# Patient Record
Sex: MALE | Race: BLACK OR AFRICAN AMERICAN | NOT HISPANIC OR LATINO | Employment: FULL TIME | URBAN - METROPOLITAN AREA
[De-identification: names, ages, dates, MRNs, and addresses within clinical notes are randomized per-mention and may not be internally consistent; named-entity substitution may affect disease eponyms.]

---

## 2018-01-03 ENCOUNTER — HOSPITAL ENCOUNTER (EMERGENCY)
Facility: HOSPITAL | Age: 58
Discharge: HOME | End: 2018-01-03
Attending: EMERGENCY MEDICINE | Admitting: EMERGENCY MEDICINE
Payer: COMMERCIAL

## 2018-01-03 ENCOUNTER — APPOINTMENT (EMERGENCY)
Dept: RADIOLOGY | Facility: HOSPITAL | Age: 58
End: 2018-01-03
Payer: COMMERCIAL

## 2018-01-03 VITALS
OXYGEN SATURATION: 90 % | TEMPERATURE: 98.2 F | SYSTOLIC BLOOD PRESSURE: 166 MMHG | HEART RATE: 80 BPM | DIASTOLIC BLOOD PRESSURE: 91 MMHG | RESPIRATION RATE: 18 BRPM | WEIGHT: 181 LBS

## 2018-01-03 DIAGNOSIS — R68.89 FLU-LIKE SYMPTOMS: ICD-10-CM

## 2018-01-03 DIAGNOSIS — R11.0 NAUSEA: Primary | ICD-10-CM

## 2018-01-03 LAB
ALBUMIN SERPL BCP-MCNC: 4.2 G/DL (ref 3.5–5)
ALP SERPL-CCNC: 95 U/L (ref 46–116)
ALT SERPL W P-5'-P-CCNC: 39 U/L (ref 12–78)
ANION GAP SERPL CALCULATED.3IONS-SCNC: 13 MMOL/L (ref 4–13)
AST SERPL W P-5'-P-CCNC: 16 U/L (ref 5–45)
ATRIAL RATE: 70 BPM
BASOPHILS # BLD AUTO: 0.04 THOUSANDS/ΜL (ref 0–0.1)
BASOPHILS NFR BLD AUTO: 0 % (ref 0–1)
BILIRUB SERPL-MCNC: 1.1 MG/DL (ref 0.2–1)
BUN SERPL-MCNC: 10 MG/DL (ref 5–25)
CALCIUM SERPL-MCNC: 9.5 MG/DL (ref 8.3–10.1)
CHLORIDE SERPL-SCNC: 99 MMOL/L (ref 100–108)
CO2 SERPL-SCNC: 23 MMOL/L (ref 21–32)
CREAT SERPL-MCNC: 0.94 MG/DL (ref 0.6–1.3)
EOSINOPHIL # BLD AUTO: 0.09 THOUSAND/ΜL (ref 0–0.61)
EOSINOPHIL NFR BLD AUTO: 1 % (ref 0–6)
ERYTHROCYTE [DISTWIDTH] IN BLOOD BY AUTOMATED COUNT: 11.7 % (ref 11.6–15.1)
GFR SERPL CREATININE-BSD FRML MDRD: 105 ML/MIN/1.73SQ M
GLUCOSE SERPL-MCNC: 264 MG/DL (ref 65–140)
GLUCOSE SERPL-MCNC: 311 MG/DL (ref 65–140)
HCT VFR BLD AUTO: 43 % (ref 36.5–49.3)
HGB BLD-MCNC: 15.5 G/DL (ref 12–17)
LYMPHOCYTES # BLD AUTO: 3.81 THOUSANDS/ΜL (ref 0.6–4.47)
LYMPHOCYTES NFR BLD AUTO: 32 % (ref 14–44)
MCH RBC QN AUTO: 32.6 PG (ref 26.8–34.3)
MCHC RBC AUTO-ENTMCNC: 36 G/DL (ref 31.4–37.4)
MCV RBC AUTO: 90 FL (ref 82–98)
MONOCYTES # BLD AUTO: 0.72 THOUSAND/ΜL (ref 0.17–1.22)
MONOCYTES NFR BLD AUTO: 6 % (ref 4–12)
NEUTROPHILS # BLD AUTO: 7.32 THOUSANDS/ΜL (ref 1.85–7.62)
NEUTS SEG NFR BLD AUTO: 61 % (ref 43–75)
P AXIS: 41 DEGREES
PLATELET # BLD AUTO: 322 THOUSANDS/UL (ref 149–390)
PMV BLD AUTO: 10.1 FL (ref 8.9–12.7)
POTASSIUM SERPL-SCNC: 3.8 MMOL/L (ref 3.5–5.3)
PR INTERVAL: 142 MS
PROT SERPL-MCNC: 7.8 G/DL (ref 6.4–8.2)
QRS AXIS: 36 DEGREES
QRSD INTERVAL: 84 MS
QT INTERVAL: 386 MS
QTC INTERVAL: 408 MS
RBC # BLD AUTO: 4.76 MILLION/UL (ref 3.88–5.62)
SODIUM SERPL-SCNC: 135 MMOL/L (ref 136–145)
T WAVE AXIS: 43 DEGREES
TROPONIN I SERPL-MCNC: <0.02 NG/ML
VENTRICULAR RATE: 67 BPM
WBC # BLD AUTO: 11.98 THOUSAND/UL (ref 4.31–10.16)

## 2018-01-03 PROCEDURE — 93005 ELECTROCARDIOGRAM TRACING: CPT | Performed by: PHYSICIAN ASSISTANT

## 2018-01-03 PROCEDURE — 84484 ASSAY OF TROPONIN QUANT: CPT | Performed by: PHYSICIAN ASSISTANT

## 2018-01-03 PROCEDURE — 85025 COMPLETE CBC W/AUTO DIFF WBC: CPT | Performed by: PHYSICIAN ASSISTANT

## 2018-01-03 PROCEDURE — 71046 X-RAY EXAM CHEST 2 VIEWS: CPT

## 2018-01-03 PROCEDURE — 82948 REAGENT STRIP/BLOOD GLUCOSE: CPT

## 2018-01-03 PROCEDURE — 93005 ELECTROCARDIOGRAM TRACING: CPT

## 2018-01-03 PROCEDURE — 36415 COLL VENOUS BLD VENIPUNCTURE: CPT | Performed by: PHYSICIAN ASSISTANT

## 2018-01-03 PROCEDURE — 87798 DETECT AGENT NOS DNA AMP: CPT | Performed by: PHYSICIAN ASSISTANT

## 2018-01-03 PROCEDURE — 99284 EMERGENCY DEPT VISIT MOD MDM: CPT

## 2018-01-03 PROCEDURE — 80053 COMPREHEN METABOLIC PANEL: CPT | Performed by: PHYSICIAN ASSISTANT

## 2018-01-03 RX ORDER — ONDANSETRON 2 MG/ML
4 INJECTION INTRAMUSCULAR; INTRAVENOUS ONCE
Status: DISCONTINUED | OUTPATIENT
Start: 2018-01-03 | End: 2018-01-03 | Stop reason: HOSPADM

## 2018-01-03 RX ORDER — ONDANSETRON 4 MG/1
4 TABLET, ORALLY DISINTEGRATING ORAL EVERY 8 HOURS PRN
Qty: 15 TABLET | Refills: 0 | Status: SHIPPED | OUTPATIENT
Start: 2018-01-03 | End: 2018-03-08 | Stop reason: ALTCHOICE

## 2018-01-03 NOTE — DISCHARGE INSTRUCTIONS
Viral Syndrome   WHAT YOU NEED TO KNOW:   Viral syndrome is a term used for a viral infection that has no clear cause  Viruses are spread easily from person to person through the air and on shared items  DISCHARGE INSTRUCTIONS:   Call 911 for the following:   · You have a seizure  · You cannot be woken  · You have chest pain or trouble breathing  Return to the emergency department if:   · You have a stiff neck, a bad headache, and sensitivity to light  · You feel weak, dizzy, or confused  · You stop urinating or urinate a lot less than normal      · You cough up blood or thick, yellow or green, mucus  · You have severe abdominal pain or your abdomen is larger than usual   Contact your healthcare provider if:   · Your symptoms do not get better with treatment, or get worse, after 3 days  · You have a rash or ear pain  · You have burning when you urinate  · You have questions or concerns about your condition or care  Medicines: You may  need any of the following:  · Acetaminophen  decreases pain and fever  It is available without a doctor's order  Ask how much medicine to take and how often to take it  Follow directions  Acetaminophen can cause liver damage if not taken correctly  · NSAIDs , such as ibuprofen, help decrease swelling, pain, and fever  NSAIDs can cause stomach bleeding or kidney problems in certain people  If you take blood thinner medicine, always ask your healthcare provider if NSAIDs are safe for you  Always read the medicine label and follow directions  · Cold medicine  helps decrease swelling, control a cough, and relieve chest or nasal congestion  · Saline nasal spray  helps decrease nasal congestion  · Take your medicine as directed  Contact your healthcare provider if you think your medicine is not helping or if you have side effects  Tell him of her if you are allergic to any medicine   Keep a list of the medicines, vitamins, and herbs you take  Include the amounts, and when and why you take them  Bring the list or the pill bottles to follow-up visits  Carry your medicine list with you in case of an emergency  Manage your symptoms:   · Drink liquids as directed  to prevent dehydration  Ask how much liquid to drink each day and which liquids are best for you  Ask if you should drink an oral rehydration solution (ORS)  An ORS has the right amounts of water, salts, and sugar you need to replace body fluids  This may help prevent dehydration caused by vomiting or diarrhea  Do not drink liquids with caffeine  Drinks with caffeine can make dehydration worse  · Get plenty of rest  to help your body heal  Take naps throughout the day  Ask your healthcare provider when you can return to work and your normal activities  · Use a cool mist humidifier  to help you breathe easier if you have nasal or chest congestion  Ask your healthcare provider how to use a cool mist humidifier  · Eat honey or use cough drops  to help decrease throat discomfort  Ask your healthcare provider how much honey you should eat each day  Cough drops are available without a doctor's order  Follow directions for taking cough drops  · Do not smoke and stay away from others who smoke  Nicotine and other chemicals in cigarettes and cigars can cause lung damage  Smoking can also delay healing  Ask your healthcare provider for information if you currently smoke and need help to quit  E-cigarettes or smokeless tobacco still contain nicotine  Talk to your healthcare provider before you use these products  · Wash your hands frequently  to prevent the spread of germs to others  Use soap and water  Use gel hand  when soap and water are not available  Wash your hands after you use the bathroom, cough, or sneeze  Wash your hands before you prepare or eat food    Follow up with your healthcare provider as directed:  Write down your questions so you remember to ask them during your visits  © 2017 [employer] Information is for End User's use only and may not be sold, redistributed or otherwise used for commercial purposes  All illustrations and images included in CareNotes® are the copyrighted property of [employer]  or Osiel Rodriguez  The above information is an  only  It is not intended as medical advice for individual conditions or treatments  Talk to your doctor, nurse or pharmacist before following any medical regimen to see if it is safe and effective for you  Acute Nausea and Vomiting   WHAT YOU NEED TO KNOW:   Acute nausea and vomiting start suddenly, worsen quickly, and last a short time  DISCHARGE INSTRUCTIONS:   Return to the emergency department if:   · You see blood in your vomit or your bowel movements  · You have sudden, severe pain in your chest and upper abdomen after hard vomiting or retching  · You have swelling in your neck and chest      · You are dizzy, cold, and thirsty and your eyes and mouth are dry  · You are urinating very little or not at all  · You have muscle weakness, leg cramps, and trouble breathing  · Your heart is beating much faster than normal      · You continue to vomit for more than 48 hours  Contact your healthcare provider if:   · You have frequent dry heaves (vomiting but nothing comes out)  · Your nausea and vomiting does not get better or go away after you use medicine  · You have questions or concerns about your condition or treatment  Medicines: You may need any of the following:  · Medicines  may be given to calm your stomach and stop your vomiting  You may also need medicines to help you feel more relaxed or to stop nausea and vomiting caused by motion sickness  · Gastrointestinal stimulants  are used to help empty your stomach and bowels  This may help decrease nausea and vomiting  · Take your medicine as directed    Contact your healthcare provider if you think your medicine is not helping or if you have side effects  Tell him or her if you are allergic to any medicine  Keep a list of the medicines, vitamins, and herbs you take  Include the amounts, and when and why you take them  Bring the list or the pill bottles to follow-up visits  Carry your medicine list with you in case of an emergency  Prevent or manage acute nausea and vomiting:   · Do not drink alcohol  Alcohol may upset or irritate your stomach  Too much alcohol can also cause acute nausea and vomiting  · Control stress  Headaches due to stress may cause nausea and vomiting  Find ways to relax and manage your stress  Get more rest and sleep  · Drink more liquids as directed  Vomiting can lead to dehydration  It is important to drink more liquids to help replace lost body fluids  Ask your healthcare provider how much liquid to drink each day and which liquids are best for you  Your provider may recommend that you drink an oral rehydration solution (ORS)  ORS contains water, salts, and sugar that are needed to replace the lost body fluids  Ask what kind of ORS to use, how much to drink, and where to get it  · Eat smaller meals, more often  Eat small amounts of food every 2 to 3 hours, even if you are not hungry  Food in your stomach may decrease your nausea  · Talk to your healthcare provider before you take over-the-counter (OTC) medicines  These medicines can cause serious problems if you use certain other medicines, or you have a medical condition  You may have problems if you use too much or use them for longer than the label says  Follow directions on the label carefully  Follow up with your healthcare provider as directed:  Write down your questions so you remember to ask them during your follow-up visits  © 2017 [employer] Information is for End User's use only and may not be sold, redistributed or otherwise used for commercial purposes   All illustrations and images included in CareNotes® are the copyrighted property of [employer]  or Osiel Rodriguez  The above information is an  only  It is not intended as medical advice for individual conditions or treatments  Talk to your doctor, nurse or pharmacist before following any medical regimen to see if it is safe and effective for you  Acute Nausea and Vomiting   WHAT YOU NEED TO KNOW:   Acute nausea and vomiting start suddenly, worsen quickly, and last a short time  DISCHARGE INSTRUCTIONS:   Return to the emergency department if:   · You see blood in your vomit or your bowel movements  · You have sudden, severe pain in your chest and upper abdomen after hard vomiting or retching  · You have swelling in your neck and chest      · You are dizzy, cold, and thirsty and your eyes and mouth are dry  · You are urinating very little or not at all  · You have muscle weakness, leg cramps, and trouble breathing  · Your heart is beating much faster than normal      · You continue to vomit for more than 48 hours  Contact your healthcare provider if:   · You have frequent dry heaves (vomiting but nothing comes out)  · Your nausea and vomiting does not get better or go away after you use medicine  · You have questions or concerns about your condition or treatment  Medicines: You may need any of the following:  · Medicines  may be given to calm your stomach and stop your vomiting  You may also need medicines to help you feel more relaxed or to stop nausea and vomiting caused by motion sickness  · Gastrointestinal stimulants  are used to help empty your stomach and bowels  This may help decrease nausea and vomiting  · Take your medicine as directed  Contact your healthcare provider if you think your medicine is not helping or if you have side effects  Tell him or her if you are allergic to any medicine   Keep a list of the medicines, vitamins, and herbs you take  Include the amounts, and when and why you take them  Bring the list or the pill bottles to follow-up visits  Carry your medicine list with you in case of an emergency  Prevent or manage acute nausea and vomiting:   · Do not drink alcohol  Alcohol may upset or irritate your stomach  Too much alcohol can also cause acute nausea and vomiting  · Control stress  Headaches due to stress may cause nausea and vomiting  Find ways to relax and manage your stress  Get more rest and sleep  · Drink more liquids as directed  Vomiting can lead to dehydration  It is important to drink more liquids to help replace lost body fluids  Ask your healthcare provider how much liquid to drink each day and which liquids are best for you  Your provider may recommend that you drink an oral rehydration solution (ORS)  ORS contains water, salts, and sugar that are needed to replace the lost body fluids  Ask what kind of ORS to use, how much to drink, and where to get it  · Eat smaller meals, more often  Eat small amounts of food every 2 to 3 hours, even if you are not hungry  Food in your stomach may decrease your nausea  · Talk to your healthcare provider before you take over-the-counter (OTC) medicines  These medicines can cause serious problems if you use certain other medicines, or you have a medical condition  You may have problems if you use too much or use them for longer than the label says  Follow directions on the label carefully  Follow up with your healthcare provider as directed:  Write down your questions so you remember to ask them during your follow-up visits  © 2017 [employer] Information is for End User's use only and may not be sold, redistributed or otherwise used for commercial purposes  All illustrations and images included in CareNotes® are the copyrighted property of [employer]  or Osiel Rodriguez  The above information is an  only   It is not intended as medical advice for individual conditions or treatments  Talk to your doctor, nurse or pharmacist before following any medical regimen to see if it is safe and effective for you

## 2018-01-03 NOTE — ED PROVIDER NOTES
History  Chief Complaint   Patient presents with    Nausea     pt feels nauseous/weak today, denies abd pain     81-year-old male presents to the emergency department with complaints of nausea  States that he has had some nausea over the course of the day as well as some weakness  States that he has some myalgias in his upper extremities and some weakness and knee pain in the lower extremities  He denies chest pain  No diarrhea  States that he started with a slight cough earlier today  Notes that his daughter has been sick with similar symptoms over the past week  No fevers at home  No flu shot this season  States that he is diabetic and thought that his sugars may have been low so he drank some orange juice earlier  Has not checked his sugar  History provided by:  Patient   used: No    Nausea   The primary symptoms include nausea, myalgias and arthralgias  Primary symptoms do not include fever, weight loss, fatigue, abdominal pain, vomiting, diarrhea, melena, hematemesis, jaundice, hematochezia, dysuria or rash  The myalgias are not associated with weakness  The illness does not include chills or constipation  None       Past Medical History:   Diagnosis Date    Diabetes mellitus ([facility] [address] )        History reviewed  No pertinent surgical history  History reviewed  No pertinent family history  I have reviewed and agree with the history as documented  Social History   Substance Use Topics    Smoking status: Never Smoker    Smokeless tobacco: Never Used    Alcohol use Yes        Review of Systems   Constitutional: Negative for activity change, appetite change, chills, fatigue, fever and weight loss  HENT: Negative for congestion, dental problem, drooling, ear discharge, ear pain, mouth sores, nosebleeds, rhinorrhea, sore throat and trouble swallowing  Eyes: Negative for pain, discharge and itching  Respiratory: Positive for cough   Negative for chest tightness, shortness of breath and wheezing  Cardiovascular: Negative for chest pain and palpitations  Gastrointestinal: Positive for nausea  Negative for abdominal pain, blood in stool, constipation, diarrhea, hematemesis, hematochezia, jaundice, melena and vomiting  Endocrine: Negative for cold intolerance and heat intolerance  Genitourinary: Negative for difficulty urinating, dysuria, flank pain, frequency and urgency  Musculoskeletal: Positive for arthralgias and myalgias  Skin: Negative for rash and wound  Allergic/Immunologic: Negative for food allergies and immunocompromised state  Neurological: Negative for dizziness, seizures, syncope, weakness, numbness and headaches  Psychiatric/Behavioral: Negative for agitation, behavioral problems and confusion  Physical Exam  ED Triage Vitals   Temperature Pulse Respirations Blood Pressure SpO2   01/03/18 1333 01/03/18 1333 01/03/18 1333 01/03/18 1333 01/03/18 1333   98 2 °F (36 8 °C) 80 18 157/83 99 %      Temp Source Heart Rate Source Patient Position - Orthostatic VS BP Location FiO2 (%)   01/03/18 1333 01/03/18 1646 01/03/18 1333 01/03/18 1333 --   Oral Monitor Sitting Left arm       Pain Score       01/03/18 1333       No Pain           Orthostatic Vital Signs  Vitals:    01/03/18 1333 01/03/18 1646   BP: 157/83 158/86   Pulse: 80 65   Patient Position - Orthostatic VS: Sitting Sitting       Physical Exam   Constitutional: He is oriented to person, place, and time  He appears well-developed and well-nourished  No distress  HENT:   Head: Normocephalic and atraumatic  Right Ear: External ear normal    Left Ear: External ear normal    Mouth/Throat: Oropharynx is clear and moist  No oropharyngeal exudate  Eyes: Conjunctivae are normal    Neck: No JVD present  No tracheal deviation present  Cardiovascular: Normal rate, regular rhythm and normal heart sounds  Exam reveals no gallop and no friction rub  No murmur heard    Pulmonary/Chest: Effort normal and breath sounds normal  No respiratory distress  He has no wheezes  He has no rales  He exhibits no tenderness  Abdominal: Soft  Bowel sounds are normal  He exhibits no distension  There is no tenderness  There is no guarding  Musculoskeletal: Normal range of motion  He exhibits no edema, tenderness or deformity  Lymphadenopathy:     He has no cervical adenopathy  Neurological: He is alert and oriented to person, place, and time  Skin: Skin is warm and dry  No rash noted  He is not diaphoretic  No erythema  Psychiatric: He has a normal mood and affect  His behavior is normal    Nursing note and vitals reviewed  ED Medications  Medications   sodium chloride 0 9 % bolus 1,000 mL (1,000 mL Intravenous Not Given 1/3/18 1637)   ondansetron (ZOFRAN) injection 4 mg (4 mg Intravenous Not Given 1/3/18 1637)       Diagnostic Studies  Results Reviewed     Procedure Component Value Units Date/Time    Troponin I [[id number]]  (Normal) Collected:  01/03/18 1708    Lab Status:  Final result Specimen:  Blood from Arm, Right Updated:  01/03/18 1810     Troponin I <0 02 ng/mL     Narrative:         [employer] Chemistry analyzer 99% cutoff is > 0 04 ng/mL in network labs    o cTnI 99% cutoff is useful only when applied to patients in the clinical setting of myocardial ischemia  o cTnI 99% cutoff should be interpreted in the context of clinical history, ECG findings and possibly cardiac imaging to establish correct diagnosis  o cTnI 99% cutoff may be suggestive but clearly not indicative of a coronary event without the clinical setting of myocardial ischemia      Comprehensive metabolic panel [[id number]]  (Abnormal) Collected:  01/03/18 1637    Lab Status:  Final result Specimen:  Blood from Arm, Left Updated:  01/03/18 1710     Sodium 135 (L) mmol/L      Potassium 3 8 mmol/L      Chloride 99 (L) mmol/L      CO2 23 mmol/L      Anion Gap 13 mmol/L      BUN 10 mg/dL      Creatinine 0 94 mg/dL      Glucose 311 (H) mg/dL      Calcium 9 5 mg/dL      AST 16 U/L      ALT 39 U/L      Alkaline Phosphatase 95 U/L      Total Protein 7 8 g/dL      Albumin 4 2 g/dL      Total Bilirubin 1 10 (H) mg/dL      eGFR 105 ml/min/1 73sq m     Narrative:         National Kidney Disease Education Program recommendations are as follows:  GFR calculation is accurate only with a steady state creatinine  Chronic Kidney disease less than 60 ml/min/1 73 sq  meters  Kidney failure less than 15 ml/min/1 73 sq  meters  Fingerstick Glucose (POCT) [[id number]]  (Abnormal) Collected:  01/03/18 1650    Lab Status:  Final result Updated:  01/03/18 1704     POC Glucose 264 (H) mg/dl     CBC and differential [[id number]]  (Abnormal) Collected:  01/03/18 1637    Lab Status:  Final result Specimen:  Blood from Arm, Left Updated:  01/03/18 1644     WBC 11 98 (H) Thousand/uL      RBC 4 76 Million/uL      Hemoglobin 15 5 g/dL      Hematocrit 43 0 %      MCV 90 fL      MCH 32 6 pg      MCHC 36 0 g/dL      RDW 11 7 %      MPV 10 1 fL      Platelets 582 Thousands/uL      Neutrophils Relative 61 %      Lymphocytes Relative 32 %      Monocytes Relative 6 %      Eosinophils Relative 1 %      Basophils Relative 0 %      Neutrophils Absolute 7 32 Thousands/µL      Lymphocytes Absolute 3 81 Thousands/µL      Monocytes Absolute 0 72 Thousand/µL      Eosinophils Absolute 0 09 Thousand/µL      Basophils Absolute 0 04 Thousands/µL     Influenza A/B and RSV by PCR (indicated for patients >2 mo of age) [[id number]] Collected:  01/03/18 1637    Lab Status:   In process Specimen:  Nasopharyngeal from Nasopharyngeal Swab Updated:  01/03/18 1640    POCT urinalysis dipstick [[id number]]     Lab Status:  No result Specimen:  Urine                  XR chest 2 views   ED Interpretation by Rafaela Bains PA-C (01/03 1739)   Clear lungs                 Procedures  ECG 12 Lead Documentation  Date/Time: 1/3/2018 5:39 PM  Performed by: Steve Joyner by: Tyree Bone Indications / Diagnosis:  Nausea  ECG reviewed by me, the ED Provider: yes    Patient location:  ED  Previous ECG:     Previous ECG:  Unavailable    Comparison to cardiac monitor: Yes    Interpretation:     Interpretation: abnormal    Rate:     ECG rate:  68    ECG rate assessment: normal    Rhythm:     Rhythm: sinus rhythm    Ectopy:     Ectopy: none    QRS:     QRS intervals:  Normal  Conduction:     Conduction: normal    ST segments:     ST segments:  Normal  T waves:     T waves: normal             Phone Contacts  ED Phone Contact    ED Course  ED Course                                MDM  Number of Diagnoses or Management Options  Flu-like symptoms:   Nausea:   Diagnosis management comments: Differential diagnosis includes but not limited to:  Upper respiratory infection, influenza, gastritis, ACS       Amount and/or Complexity of Data Reviewed  Clinical lab tests: ordered and reviewed  Tests in the radiology section of CPT®: ordered and reviewed  Independent visualization of images, tracings, or specimens: yes      CritCare Time    Disposition  Final diagnoses:   Nausea   Flu-like symptoms     Time reflects when diagnosis was documented in both MDM as applicable and the Disposition within this note     Time User Action Codes Description Comment    1/3/2018  6:29 PM Rajesh Rivera Add [R11 0] Nausea     1/3/2018  6:29 PM Rajesh Rivera Add [R68 89] Flu-like symptoms       ED Disposition     ED Disposition Condition Comment    Discharge  Lacy Romero discharge to home/self care      Condition at discharge: Stable        Follow-up Information     Follow up With Specialties Details Why Contact Info    Clarke Martins MD Family Medicine Schedule an appointment as soon as possible for a visit  [address] [facility] [address]  [phone]          Patient's Medications   Discharge Prescriptions    ONDANSETRON (ZOFRAN ODT) 4 MG DISINTEGRATING TABLET    Take 1 tablet by mouth every 8 (eight) hours as needed for nausea or vomiting for up to 15 doses       Start Date: 1/3/2018  End Date: --       Order Dose: 4 mg       Quantity: 15 tablet    Refills: 0     No discharge procedures on file      ED Provider  Electronically Signed by           Fabiola Green PA-C  01/03/18 8597

## 2018-01-03 NOTE — ED NOTES
Pt refusing an IV, okay to get blood work drawn per pt  Hal Collado PA-C aware        Yuri Barrientos RN  01/03/18 3108

## 2018-01-04 LAB
FLUAV AG SPEC QL: NORMAL
FLUBV AG SPEC QL: NORMAL
RSV B RNA SPEC QL NAA+PROBE: NORMAL

## 2018-01-11 NOTE — PROGRESS NOTES
Assessment    1  Annual physical exam (V70 0) (Z00 00)   2  Encounter for screening colonoscopy (V76 51) (Z12 11)   3  DMII (diabetes mellitus, type 2) (250 00) (E11 9)   4  Prostate cancer screening (V76 44) (Z12 5)    Plan  DMII (diabetes mellitus, type 2)    · (1) CBC/ PLT (NO DIFF); Status:Active; Requested for:01Mar2016;    · (1) COMPREHENSIVE METABOLIC PANEL; Status:Active; Requested for:01Mar2016;    · (1) HEMOGLOBIN A1C; Status:Active; Requested for:01Mar2016;    · (1) LIPID PANEL, FASTING; Status:Active; Requested for:01Mar2016;    · (1) MICROALBUMIN CREATININE RATIO, RANDOM URINE; Status:Active; Requested  for:01Mar2016;    · (1) TSH; Status:Active; Requested for:01Mar2016;   Encounter for screening colonoscopy    · COLONOSCOPY; Status:Active; Requested for:01Mar2016;    · 1 - Jose De Jesus Bautista MD (Gastroenterology) Physician Referral  Consult  Status: Active   Requested for: [id number]  Care Summary provided  : Yes  Prostate cancer screening    · (1) PSA (SCREEN) (Dx V76 44 Screen for Prostate Cancer); Status:Active; Requested  for:01Mar2016;     Discussion/Summary  Impression: health maintenance visit  Prostate cancer screening: PSA was ordered  Colorectal cancer screening: the risks and benefits of colorectal cancer screening were discussed and colonoscopy has been ordered  Screening lab work includes glucose and lipid profile  The immunizations are up to date  Advice and education were given regarding nutrition, aerobic exercise, alcohol use and self skin examination  Patient discussion: discussed with the patient  Normal physical exam , he is diabetic and hyperlipidemia he is not taking medicine for last 2 months,  Advised to have labs and followup in a week, so I can restart him on medication according to his blood work,  His blood pressure is fine without medication today,  Discussed with him about his alcohol excessive use on the weekend is not good for him he should try to cut it down,   f/u 1 wk  Chief Complaint  preventative      History of Present Illness  HM, Adult Male: The patient is being seen for a health maintenance evaluation  Social History: Household members include spouse  He is   Work status: working full time and occupation: construction  The patient is a former cigarette smoker and quit 6 years ago  He reports 12 bear on weeend on friday and saturday  He has never used illicit drugs  General Health: The patient's health since the last visit is described as fair  Lifestyle:  He does not have a healthy diet  He does not have any weight concerns  He exercises regularly  He does not use tobacco  He consumes alcohol  He denies drug use  Screening:   HPI: physical   He is diabetic and he is out of his medicine for 2 months, he is not taking any meds ,      Review of Systems    Constitutional: No fever or chills, feels well, no tiredness, no recent weight gain or weight loss  Eyes: No complaints of eye pain, no red eyes, no discharge from eyes, no itchy eyes  ENT: no complaints of earache, no hearing loss, no nosebleeds, no nasal discharge, no sore throat, no hoarseness  Cardiovascular: occasional chest discomfort , no relation with activity  Respiratory: No complaints of shortness of breath, no wheezing, no cough, no SOB on exertion, no orthopnea or PND  Gastrointestinal: No complaints of abdominal pain, no constipation, no nausea or vomiting, no diarrhea or bloody stools  Genitourinary: No complaints of dysuria, no incontinence, no hesitancy, no nocturia, no genital lesion, no testicular pain  Musculoskeletal: No complaints of arthralgia, no myalgias, no joint swelling or stiffness, no limb pain or swelling  Integumentary: No complaints of skin rash or skin lesions, no itching, no skin wound, no dry skin     Neurological: No compliants of headache, no confusion, no convulsions, no numbness or tingling, no dizziness or fainting, no limb weakness, no difficulty walking  Psychiatric: Is not suicidal, no sleep disturbances, no anxiety or depression, no change in personality, no emotional problems  Endocrine: No complaints of proptosis, no hot flashes, no muscle weakness, no erectile dysfunction, no deepening of the voice, no feelings of weakness  Hematologic/Lymphatic: No complaints of swollen glands, no swollen glands in the neck, does not bleed easily, no easy bruising  ROS reviewed  Active Problems    1  A Fall From A Ladder (E881 0)   2  Allergic rhinitis (477 9) (J30 9)   3  Backache (724 5) (M54 9)   4  Benign essential hypertension (401 1) (I10)   5  DMII (diabetes mellitus, type 2) (250 00) (E11 9)   6  Hyperlipidemia (272 4) (E78 5)   7  Impaired fasting glucose (790 21) (R73 01)   8  Late Effect Of Injury, Internal, To Chest (908 0)   9  Neck Sprain (847 0)   10  Pain in joint of left shoulder (719 41) (M25 512)   11  Type 2 diabetes mellitus (250 00) (E11 9)    Past Medical History    · History of Benign essential hypertension (401 1) (I10)   · History of hyperlipidemia (V12 29) (Z86 39)   · History of Polyp of sigmoid colon (211 3) (D12 5)    Surgical History    · History of Colonoscopy (Fiberoptic) Screening    Family History    · Family history of Type 2 Diabetes Mellitus    · Family history of Type 2 Diabetes Mellitus    Social History    · Alcohol Use (History)   · Weekend Beer drinker   · Former smoker (V15 82) (C75 209)   ·    · Occupation:   · Works in Construction    Current Meds   1  Aspirin EC 81 MG Oral Tablet Delayed Release; TAKE 1 TABLET DAILY; Therapy: 78GIJ4682 to (Evaluate:01Zgm1595); Last Rx:12Mar2013 Ordered   2  Lisinopril 5 MG Oral Tablet; TAKE 1 TABLET DAILY AS DIRECTED; Therapy: 76ERV2487 to (Evaluate:36Adh7211)  Requested for: 19FUP9172; Last   Rx:18Jun2013 Ordered   3  MetFORMIN HCl - 1000 MG Oral Tablet; take one tablet by mouth twice daily;    Therapy: 61ETU4493 to (Evaluate:11Gcu4768)  Requested for: 60VHB4800; Last   Rx:25Mar2014 Ordered   4  Pravastatin Sodium 20 MG Oral Tablet; TAKE 1 TABLET Daily Take at 1000 [facility] Time for   high Cholesterol; Therapy: 38NRX3103 to (Evaluate:52Uzc8351)  Requested for: 53RLQ3368; Last   Rx:18Jun2013 Ordered    Allergies    1  No Known Drug Allergies   2  No Known Drug Allergies    Vitals   Recorded: 69HUA1601 06:25PM   Heart Rate 80   Systolic 687   Diastolic 78   Height 5 ft 5 in   Weight 184 lb    BMI Calculated 30 62   BSA Calculated 1 91     Physical Exam    Constitutional   General appearance: No acute distress, well appearing and well nourished  Head and Face   Head and face: Normal     Palpation of the face and sinuses: No sinus tenderness  Eyes   Conjunctiva and lids: No erythema, swelling or discharge  Pupils and irises: Equal, round, reactive to light  Ears, Nose, Mouth, and Throat   External inspection of ears and nose: Normal     Otoscopic examination: Tympanic membranes translucent with normal light reflex  Canals patent without erythema  Hearing: Normal     Nasal mucosa, septum, and turbinates: Normal without edema or erythema  Lips, teeth, and gums: Normal, good dentition  Oropharynx: Normal with no erythema, edema, exudate or lesions  Neck   Neck: Supple, symmetric, trachea midline, no masses  Thyroid: Normal, no thyromegaly  Pulmonary   Respiratory effort: No increased work of breathing or signs of respiratory distress  Percussion of chest: Normal     Palpation of chest: Normal     Auscultation of lungs: Clear to auscultation  Cardiovascular   Palpation of heart: Normal PMI, no thrills  Auscultation of heart: Normal rate and rhythm, normal S1 and S2, no murmurs  Carotid pulses: 2+ bilaterally  Examination of extremities for edema and/or varicosities: Normal     Chest   Breasts: Normal, no dimpling or skin changes appreciated  Palpation of breasts and axillae: Normal, no masses palpated      Chest: Normal     Abdomen Abdomen: Non-tender, no masses  Liver and spleen: No hepatomegaly or splenomegaly  Lymphatic   Palpation of lymph nodes in neck: No lymphadenopathy  Palpation of lymph nodes in axillae: No lymphadenopathy  Palpation of lymph nodes in groin: No lymphadenopathy  Musculoskeletal   Gait and station: Normal     Inspection/palpation of digits and nails: Normal without clubbing or cyanosis  Inspection/palpation of joints, bones, and muscles: Normal     Range of motion: Normal     Stability: Normal     Muscle strength/tone: Normal     Skin   Skin and subcutaneous tissue: Normal without rashes or lesions  Palpation of skin and subcutaneous tissue: Normal turgor  Neurologic   Cranial nerves: Cranial nerves 2-12 intact  Reflexes: 2+ and symmetric  Sensation: No sensory loss  Psychiatric   Judgment and insight: Normal     Orientation to person, place and time: Normal     Recent and remote memory: Intact      Mood and affect: Normal        Procedure    Procedure:   Results: 20/25 in both eyes without corrective device, 20/25 in the right eye without corrective device, 20/25 in the left eye without corrective device      Signatures   Electronically signed by : KANDI Retana ; Mar  1 2016  6:57PM EST                       (Author)

## 2018-01-11 NOTE — RESULT NOTES
Message   Patient has abnormal WBC count, he has appointment in few days needs to be discussed,   If he in case cancel his appointment, margie please call him,     Verified Results  (1) TSH [id number] 12:40PM Shan Burnett Order Number: [id number]     Test Name Result Flag Reference   TSH 1 130 uIU/mL  0 358-3 740   - Patient Instructions: This bloodwork is non-fasting  Please drink two glasses of water morning of bloodwork  - Patient Instructions: This bloodwork is non-fasting  Please drink two glasses of water morning of bloodwork  Patients undergoing fluorescein dye angiography may retain small amounts of fluorescein in the body for 48-72 hours post procedure  Samples containing fluorescein can produce falsely depressed TSH values  If the patient had this procedure,a specimen should be resubmitted post fluorescein clearance  (1) CBC/ PLT (NO DIFF) [id number] 11:22AM Shan Burnett Order Number: [id number]     Test Name Result Flag Reference   HEMATOCRIT 46 5 %  36 5-49 3   HEMOGLOBIN 16 6 g/dL  12 0-17 0   MCHC 35 7 g/dL  31 4-37 4   MCH 32 9 pg  26 8-34 3   MCV 92 fL  82-98   PLATELET COUNT 842 Thousands/uL H 149-390   RBC COUNT 5 05 Million/uL  3 88-5 62   RDW 12 2 %  11 6-15 1   WBC COUNT 17 09 Thousand/uL H 4 31-10 16   MPV 10 6 fL  8 9-12 7     (1) COMPREHENSIVE METABOLIC PANEL 98OGD4947 80:88SK Shan Burnett Order Number: [id number]     Test Name Result Flag Reference   GLUCOSE,RANDM 129 mg/dL     If the patient is fasting, the ADA then defines impaired fasting glucose as > 100 mg/dL and diabetes as > or equal to 123 mg/dL     SODIUM 137 mmol/L  136-145   POTASSIUM 3 9 mmol/L  3 5-5 3   CHLORIDE 104 mmol/L  100-108   CARBON DIOXIDE 23 mmol/L  21-32   ANION GAP (CALC) 10 mmol/L  4-13   BLOOD UREA NITROGEN 13 mg/dL  5-25   CREATININE 1 07 mg/dL  0 60-1 30   Standardized to IDMS reference method   CALCIUM 9 4 mg/dL  8 3-10 1   BILI, TOTAL 1 33 mg/dL H 0  20-1 00   ALK PHOSPHATAS 82 U/L     ALT (SGPT) 35 U/L  12-78   AST(SGOT) 11 U/L  5-45   ALBUMIN 4 4 g/dL  3 5-5 0   TOTAL PROTEIN 8 6 g/dL H 6 4-8 2   eGFR Non-African American      >60 0 ml/min/1 73sq m   - Patient Instructions: This is a fasting blood test  Water,black tea or black  coffee only after 9:00pm the night before test Drink 2 glasses of water the morning of test   National Kidney Disease Education Program recommendations are as follows:  GFR calculation is accurate only with a steady state creatinine  Chronic Kidney disease less than 60 ml/min/1 73 sq  meters  Kidney failure less than 15 ml/min/1 73 sq  meters  (1) HEMOGLOBIN A1C 02Dec2016 11:22AM [employer] Order Number: [id number]     Test Name Result Flag Reference   HEMOGLOBIN A1C 8 3 % H 4 2-6 3   EST  AVG  GLUCOSE 192 mg/dl       (1) LIPID PANEL, FASTING 02Dec2016 11:22AM [employer] Order Number: [id number]     Test Name Result Flag Reference   CHOLESTEROL 193 mg/dL     HDL,DIRECT 47 mg/dL  40-60   Specimen collection should occur prior to Metamizole administration due to the potential for falsely depressed results  LDL CHOLESTEROL CALCULATED 126 mg/dL H 0-100   - Patient Instructions: This is a fasting blood test  Water,black tea or black  coffee only after 9:00pm the night before test   Drink 2 glasses of water the morning of test     - Patient Instructions:  This is a fasting blood test  Water,black tea or black  coffee only after 9:00pm the night before test Drink 2 glasses of water the morning of test   Triglyceride:         Normal              <150 mg/dl       Borderline High    150-199 mg/dl       High               200-499 mg/dl       Very High          >499 mg/dl  Cholesterol:         Desirable        <200 mg/dl      Borderline High  200-239 mg/dl      High             >239 mg/dl  HDL Cholesterol:        High    >59 mg/dL      Low     <41 mg/dL  LDL CALCULATED:    This screening LDL is a calculated result  It does not have the accuracy of the Direct Measured LDL in the monitoring of patients with hyperlipidemia and/or statin therapy  Direct Measure LDL (YNA094) must be ordered separately in these patients  TRIGLYCERIDES 98 mg/dL  <=150   Specimen collection should occur prior to N-Acetylcysteine or Metamizole administration due to the potential for falsely depressed results

## 2018-01-17 NOTE — RESULT NOTES
Verified Results  (1) LIPID PANEL, FASTING [id number] 07:10AM [employer]     Test Name Result Flag Reference   CHOLESTEROL, TOTAL 194 mg/dL  125-200   HDL CHOLESTEROL 42 mg/dL     Reference Range  Male:  > or = 40  Female:> or = 46   TRIGLICERIDES 174 mg/dL H <150   LDL-CHOLESTEROL 122 mg/dL (calc)  <130   Desirable range <100 mg/dL for patients with CHD or  diabetes and <70 mg/dL for diabetic patients with  known heart disease  CHOL/HDLC RATIO 4 6 (calc)  < OR = 5 0   NON HDL CHOLESTEROL 152 mg/dL (calc)     Target for non-HDL cholesterol is 30 mg/dL higher than   LDL cholesterol target  See Below     We received your handwritten test order and  performed the AMA defined lipid panel  If  this is not what you intended to order, please  contact your local   immediately so that we may adjust our billing  appropriately  You may also inquire about  alternative or additional testing      (1) COMPREHENSIVE METABOLIC PANEL [id number] 69:26CY [employer]     Test Name Result Flag Reference   GLUCOSE 171 mg/dL H 65-99   Fasting reference interval   UREA NITROGEN (BUN) 12 mg/dL  7-25   CREATININE 0 92 mg/dL     Reference Range            Male:   0 70-1 33            Female: 0 50-1 05            Unable to flag appropriately            due to gender not provided  For patients >52years of age, the reference limit  for Creatinine is approximately 13% higher for people  identified as -American  eGFR NON-AFR  AMERICAN 95 mL/min/1 73m2  > OR = 60   eGFR AFRICAN AMERICAN 110 mL/min/1 73m2  > OR = 60   BUN/CREATININE RATIO   9-69   NOT APPLICABLE (calc)   SODIUM 139 mmol/L  135-146   POTASSIUM 4 9 mmol/L  3 5-5 3   CHLORIDE 106 mmol/L     CARBON DIOXIDE 24 mmol/L  19-30   CALCIUM 9 4 mg/dL     Reference Range          Male:    8 6-10 3             Female:  8 6-10 4          Unable to flag appropriately          due to gender not provided     PROTEIN, TOTAL 7 1 g/dL 6  1-8  1   ALBUMIN 4 4 g/dL  3 6-5 1   GLOBULIN 2 7 g/dL (calc)  1 9-3 7   ALBUMIN/GLOBULIN RATIO 1 6 (calc)  1 0-2 5   BILIRUBIN, TOTAL 1 1 mg/dL  0 2-1 2   ALKALINE PHOSPHATASE 61 U/L     AST 15 U/L  10-35   ALT 21 U/L     Reference range            Male:   6-53            Female: 6-29            Unable to flag appropriately            due to gender not provided  (Q) CBC (H/H, RBC, INDICES, WBC, PLT) [id number] 07:10AM [employer]     Test Name Result Flag Reference   WHITE BLOOD CELL COUNT 7 9 Thousand/uL  3 8-10 8   RED BLOOD CELL COUNT 4 77 Million/uL  4 20-5 10   HEMOGLOBIN 14 9 g/dL  13 2-15 5   HEMATOCRIT 45 8 % H 38 5-45 0   MCV 96 2 fL  80 0-100 0   MCH 31 3 pg  27 0-33 0   MCHC 32 5 g/dL  32 0-36 0   RDW 13 1 %  11 0-15 0   PLATELET COUNT 317 Thousand/uL  140-400   MPV 8 9 fL  7 5-11 5     (Q) HEMOGLOBIN A1c [id number] 07:10AM [employer]     Test Name Result Flag Reference   HEMOGLOBIN A1c 7 6 % of total Hgb H <5 7   According to ADA guidelines, hemoglobin A1c <7 0%  represents optimal control in non-pregnant diabetic  patients  Different metrics may apply to specific  patient populations  Standards of Medical Care in    Diabetes Care  2013;36:s11-s66     For the purpose of screening for the presence of  diabetes  <5 7%       Consistent with the absence of diabetes  5 7-6 4%    Consistent with increased risk for diabetes              (prediabetes)  >or=6 5%    Consistent with diabetes     This assay result is consistent with diabetes  mellitus  Currently, no consensus exists for use of hemoglobin  A1c for diagnosis of diabetes for children         Signatures   Electronically signed by : KANDI Renteria ; Jun 13 2016  7:58AM EST                       (Author)

## 2018-03-08 ENCOUNTER — OFFICE VISIT (OUTPATIENT)
Dept: FAMILY MEDICINE CLINIC | Facility: CLINIC | Age: 58
End: 2018-03-08
Payer: COMMERCIAL

## 2018-03-08 VITALS
DIASTOLIC BLOOD PRESSURE: 84 MMHG | WEIGHT: 176 LBS | RESPIRATION RATE: 16 BRPM | HEART RATE: 95 BPM | BODY MASS INDEX: 29.32 KG/M2 | SYSTOLIC BLOOD PRESSURE: 134 MMHG | OXYGEN SATURATION: 98 % | HEIGHT: 65 IN

## 2018-03-08 DIAGNOSIS — E11.9 TYPE 2 DIABETES MELLITUS WITHOUT COMPLICATION, WITHOUT LONG-TERM CURRENT USE OF INSULIN (HCC): Primary | ICD-10-CM

## 2018-03-08 DIAGNOSIS — E78.2 MIXED HYPERLIPIDEMIA: ICD-10-CM

## 2018-03-08 DIAGNOSIS — K63.5 COLORECTAL POLYP DETECTED ON COLONOSCOPY: ICD-10-CM

## 2018-03-08 DIAGNOSIS — R30.0 DYSURIA: ICD-10-CM

## 2018-03-08 PROBLEM — E78.5 HYPERLIPIDEMIA: Status: ACTIVE | Noted: 2018-03-08

## 2018-03-08 LAB
SL AMB  POCT GLUCOSE, UA: >1000
SL AMB LEUKOCYTE ESTERASE,UA: 75
SL AMB POCT BILIRUBIN,UA: 1
SL AMB POCT BLOOD,UA: ABNORMAL
SL AMB POCT CLARITY,UA: CLEAR
SL AMB POCT COLOR,UA: ABNORMAL
SL AMB POCT KETONES,UA: ABNORMAL
SL AMB POCT NITRITE,UA: ABNORMAL
SL AMB POCT PH,UA: 5
SL AMB POCT SPECIFIC GRAVITY,UA: 1.03
SL AMB POCT URINE PROTEIN: ABNORMAL
SL AMB POCT UROBILINOGEN: 1

## 2018-03-08 PROCEDURE — 81003 URINALYSIS AUTO W/O SCOPE: CPT | Performed by: FAMILY MEDICINE

## 2018-03-08 PROCEDURE — 99214 OFFICE O/P EST MOD 30 MIN: CPT | Performed by: FAMILY MEDICINE

## 2018-03-08 RX ORDER — PRAVASTATIN SODIUM 20 MG
1 TABLET ORAL DAILY
COMMUNITY
Start: 2013-03-05 | End: 2018-03-08 | Stop reason: SDUPTHER

## 2018-03-08 RX ORDER — CIPROFLOXACIN 500 MG/1
500 TABLET, FILM COATED ORAL EVERY 12 HOURS SCHEDULED
Qty: 14 TABLET | Refills: 0 | Status: SHIPPED | OUTPATIENT
Start: 2018-03-08 | End: 2018-03-15

## 2018-03-08 RX ORDER — CYCLOBENZAPRINE HCL 10 MG
1 TABLET ORAL
COMMUNITY
Start: 2016-11-15 | End: 2018-03-27

## 2018-03-08 RX ORDER — PRAVASTATIN SODIUM 20 MG
20 TABLET ORAL DAILY
Qty: 30 TABLET | Refills: 5 | Status: SHIPPED | OUTPATIENT
Start: 2018-03-08 | End: 2018-11-02 | Stop reason: SDUPTHER

## 2018-03-08 NOTE — PROGRESS NOTES
Assessment/Plan:    Problem List Items Addressed This Visit        Endocrine    Type 2 diabetes mellitus without complication, without long-term current use of insulin ([facility] [address] ) - Primary     Uncontrolled diabetic, urine dipstick shows excessive moderate sugar and leukocytes will treat him for UTI and advised to have labs and follow-up in 2 weeks is noncompliant with his follow ups, he says he works in [place] and [place] and comes back late so I offered him evening appointment,         Relevant Medications    metFORMIN (GLUCOPHAGE) 1000 MG tablet    Dapagliflozin Propanediol (FARXIGA) 10 MG TABS    Other Relevant Orders    CBC and differential    Comprehensive metabolic panel    HEMOGLOBIN A1C W/ EAG ESTIMATION    TSH, 3rd generation       Other    Hyperlipidemia     Refill on prescription given used to eat low-fat diet and low carb diet         Relevant Medications    pravastatin (PRAVACHOL) 20 mg tablet    Other Relevant Orders    Lipid panel    Dysuria     Will treat him for possible UTI and follow-up         Relevant Medications    ciprofloxacin (CIPRO) 500 mg tablet    Other Relevant Orders    POCT urine dip (Completed)      Other Visit Diagnoses     Colorectal polyp detected on colonoscopy        Relevant Orders    Ambulatory referral to Gastroenterology          Chief Complaint   Patient presents with    Follow-up       Subjective:   Patient ID: Franne Nyhan is a 56 y o  male  Diabetes   He presents for his follow-up diabetic visit  He has type 2 diabetes mellitus  No MedicAlert identification noted  His disease course has been worsening  There are no hypoglycemic associated symptoms  Pertinent negatives for hypoglycemia include no dizziness, headaches, nervousness/anxiousness or pallor  Associated symptoms include polydipsia, polyphagia and polyuria   Pertinent negatives for diabetes include no blurred vision, no chest pain, no fatigue, no foot paresthesias, no foot ulcerations, no visual change, no weakness and no weight loss  There are no hypoglycemic complications  Symptoms are stable  There are no diabetic complications  Risk factors for coronary artery disease include diabetes mellitus and dyslipidemia  Current diabetic treatment includes oral agent (dual therapy)  He is compliant with treatment none of the time  His weight is stable  He is following a diabetic diet  Meal planning includes avoidance of concentrated sweets  He has not had a previous visit with a dietitian  He rarely participates in exercise  His home blood glucose trend is fluctuating dramatically  His breakfast blood glucose range is generally 140-180 mg/dl  An ACE inhibitor/angiotensin II receptor blocker is not being taken  He does not see a podiatrist Eye exam is not current  Review of Systems   Constitutional: Negative for activity change, appetite change, chills, diaphoresis, fatigue, fever, unexpected weight change and weight loss  HENT: Negative for congestion, dental problem, ear discharge, ear pain, facial swelling, hearing loss, mouth sores, nosebleeds, postnasal drip, rhinorrhea, sinus pain, sinus pressure, sneezing, sore throat, trouble swallowing and voice change  Eyes: Negative for blurred vision, photophobia, pain, discharge, redness and itching  Respiratory: Negative for cough, chest tightness, shortness of breath and wheezing  Cardiovascular: Negative for chest pain, palpitations and leg swelling  Gastrointestinal: Negative for abdominal distention, abdominal pain, blood in stool, constipation, diarrhea and nausea  Endocrine: Positive for polydipsia, polyphagia and polyuria  Negative for cold intolerance and heat intolerance  Genitourinary: Negative for dysuria, flank pain, frequency, hematuria and urgency  Musculoskeletal: Negative for arthralgias, back pain, myalgias and neck pain  Skin: Negative for color change and pallor     Allergic/Immunologic: Negative for environmental allergies and food allergies  Neurological: Negative for dizziness, weakness, light-headedness, numbness and headaches  Hematological: Negative for adenopathy  Does not bruise/bleed easily  Psychiatric/Behavioral: Negative for behavioral problems, sleep disturbance and suicidal ideas  The patient is not nervous/anxious  Objective:  Physical Exam   Constitutional: He is oriented to person, place, and time  He appears well-developed and well-nourished  HENT:   Head: Normocephalic and atraumatic  Nose: Nose normal    Mouth/Throat: Oropharynx is clear and moist  No oropharyngeal exudate  Eyes: Conjunctivae and EOM are normal  Pupils are equal, round, and reactive to light  Right eye exhibits no discharge  Left eye exhibits no discharge  No scleral icterus  Neck: Normal range of motion  Neck supple  No tracheal deviation present  No thyromegaly present  Cardiovascular: Normal rate, regular rhythm and normal heart sounds  No murmur heard  Pulmonary/Chest: Effort normal and breath sounds normal  No respiratory distress  He has no wheezes  He has no rales  Abdominal: Soft  Bowel sounds are normal  He exhibits no distension and no mass  There is no tenderness  There is no rebound  Musculoskeletal: Normal range of motion  He exhibits no edema  Lymphadenopathy:     He has no cervical adenopathy  Neurological: He is alert and oriented to person, place, and time  He has normal reflexes  No cranial nerve deficit  Skin: Skin is warm  No rash noted  No erythema  No pallor  Psychiatric: He has a normal mood and affect  His behavior is normal  Judgment and thought content normal    Nursing note and vitals reviewed           Past Surgical History:   Procedure Laterality Date    COLONOSCOPY      last assessed 3/5/2013       Family History   Problem Relation Age of Onset    Diabetes Mother      DM    Diabetes Paternal Grandmother      DM         Current Outpatient Prescriptions:     aspirin 81 MG tablet, Take 1 tablet by mouth daily, Disp: , Rfl:     Dapagliflozin Propanediol (FARXIGA) 10 MG TABS, Take 1 tablet (10 mg total) by mouth daily, Disp: 30 tablet, Rfl: 5    metFORMIN (GLUCOPHAGE) 1000 MG tablet, Take 1 tablet (1,000 mg total) by mouth 2 (two) times a day with meals, Disp: 60 tablet, Rfl: 4    pravastatin (PRAVACHOL) 20 mg tablet, Take 1 tablet (20 mg total) by mouth daily, Disp: 30 tablet, Rfl: 5    ciprofloxacin (CIPRO) 500 mg tablet, Take 1 tablet (500 mg total) by mouth every 12 (twelve) hours for 7 days, Disp: 14 tablet, Rfl: 0    cyclobenzaprine (FLEXERIL) 10 mg tablet, Take 1 tablet by mouth, Disp: , Rfl:     No Known Allergies    Vitals:    03/08/18 1437   BP: 134/84   Pulse: 95   Resp: 16   SpO2: 98%   Weight: 79 8 kg (176 lb)   Height: 5' 5" (1 651 m)

## 2018-03-08 NOTE — ASSESSMENT & PLAN NOTE
Uncontrolled diabetic, urine dipstick shows excessive moderate sugar and leukocytes will treat him for UTI and advised to have labs and follow-up in 2 weeks is noncompliant with his follow ups, he says he works in [place] and [place] and comes back late so I offered him evening appointment,

## 2018-03-10 LAB
AMORPH SED URNS QL MICRO: ABNORMAL /HPF
APPEARANCE UR: ABNORMAL
BACTERIA UR CULT: ABNORMAL
BACTERIA UR QL AUTO: ABNORMAL /HPF
BILIRUB UR QL STRIP: NEGATIVE
CAOX CRY #/AREA URNS HPF: ABNORMAL /HPF
COLOR UR: ABNORMAL
GLUCOSE UR QL STRIP: ABNORMAL
HGB UR QL STRIP: NEGATIVE
HYALINE CASTS #/AREA URNS LPF: ABNORMAL /LPF
KETONES UR QL STRIP: NEGATIVE
LEUKOCYTE ESTERASE UR QL STRIP: NEGATIVE
NITRITE UR QL STRIP: NEGATIVE
PH UR STRIP: ABNORMAL [PH] (ref 5–8)
PROT UR QL STRIP: ABNORMAL
RBC #/AREA URNS HPF: ABNORMAL /HPF
SP GR UR STRIP: 1.04 (ref 1–1.03)
SQUAMOUS #/AREA URNS HPF: ABNORMAL /HPF
WBC #/AREA URNS HPF: ABNORMAL /HPF

## 2018-03-21 ENCOUNTER — APPOINTMENT (OUTPATIENT)
Dept: LAB | Facility: CLINIC | Age: 58
End: 2018-03-21
Payer: COMMERCIAL

## 2018-03-21 DIAGNOSIS — E11.9 TYPE 2 DIABETES MELLITUS WITHOUT COMPLICATION, WITHOUT LONG-TERM CURRENT USE OF INSULIN (HCC): ICD-10-CM

## 2018-03-21 DIAGNOSIS — E78.2 MIXED HYPERLIPIDEMIA: ICD-10-CM

## 2018-03-21 LAB
ALBUMIN SERPL BCP-MCNC: 4.1 G/DL (ref 3.5–5)
ALP SERPL-CCNC: 61 U/L (ref 46–116)
ALT SERPL W P-5'-P-CCNC: 29 U/L (ref 12–78)
ANION GAP SERPL CALCULATED.3IONS-SCNC: 6 MMOL/L (ref 4–13)
AST SERPL W P-5'-P-CCNC: 18 U/L (ref 5–45)
BASOPHILS # BLD AUTO: 0.04 THOUSANDS/ΜL (ref 0–0.1)
BASOPHILS NFR BLD AUTO: 1 % (ref 0–1)
BILIRUB SERPL-MCNC: 1.25 MG/DL (ref 0.2–1)
BUN SERPL-MCNC: 11 MG/DL (ref 5–25)
CALCIUM SERPL-MCNC: 8.9 MG/DL (ref 8.3–10.1)
CHLORIDE SERPL-SCNC: 107 MMOL/L (ref 100–108)
CHOLEST SERPL-MCNC: 114 MG/DL (ref 50–200)
CO2 SERPL-SCNC: 27 MMOL/L (ref 21–32)
CREAT SERPL-MCNC: 0.99 MG/DL (ref 0.6–1.3)
EOSINOPHIL # BLD AUTO: 0.21 THOUSAND/ΜL (ref 0–0.61)
EOSINOPHIL NFR BLD AUTO: 3 % (ref 0–6)
ERYTHROCYTE [DISTWIDTH] IN BLOOD BY AUTOMATED COUNT: 12 % (ref 11.6–15.1)
EST. AVERAGE GLUCOSE BLD GHB EST-MCNC: 229 MG/DL
GFR SERPL CREATININE-BSD FRML MDRD: 99 ML/MIN/1.73SQ M
GLUCOSE P FAST SERPL-MCNC: 117 MG/DL (ref 65–99)
HBA1C MFR BLD: 9.6 % (ref 4.2–6.3)
HCT VFR BLD AUTO: 44 % (ref 36.5–49.3)
HDLC SERPL-MCNC: 43 MG/DL (ref 40–60)
HGB BLD-MCNC: 15.7 G/DL (ref 12–17)
LDLC SERPL CALC-MCNC: 56 MG/DL (ref 0–100)
LYMPHOCYTES # BLD AUTO: 3.47 THOUSANDS/ΜL (ref 0.6–4.47)
LYMPHOCYTES NFR BLD AUTO: 42 % (ref 14–44)
MCH RBC QN AUTO: 32.8 PG (ref 26.8–34.3)
MCHC RBC AUTO-ENTMCNC: 35.7 G/DL (ref 31.4–37.4)
MCV RBC AUTO: 92 FL (ref 82–98)
MONOCYTES # BLD AUTO: 0.66 THOUSAND/ΜL (ref 0.17–1.22)
MONOCYTES NFR BLD AUTO: 8 % (ref 4–12)
NEUTROPHILS # BLD AUTO: 3.84 THOUSANDS/ΜL (ref 1.85–7.62)
NEUTS SEG NFR BLD AUTO: 46 % (ref 43–75)
NRBC BLD AUTO-RTO: 0 /100 WBCS
PLATELET # BLD AUTO: 411 THOUSANDS/UL (ref 149–390)
PMV BLD AUTO: 10 FL (ref 8.9–12.7)
POTASSIUM SERPL-SCNC: 4.3 MMOL/L (ref 3.5–5.3)
PROT SERPL-MCNC: 7.6 G/DL (ref 6.4–8.2)
RBC # BLD AUTO: 4.79 MILLION/UL (ref 3.88–5.62)
SODIUM SERPL-SCNC: 140 MMOL/L (ref 136–145)
TRIGL SERPL-MCNC: 77 MG/DL
TSH SERPL DL<=0.05 MIU/L-ACNC: 0.71 UIU/ML (ref 0.36–3.74)
WBC # BLD AUTO: 8.25 THOUSAND/UL (ref 4.31–10.16)

## 2018-03-21 PROCEDURE — 84443 ASSAY THYROID STIM HORMONE: CPT

## 2018-03-21 PROCEDURE — 80053 COMPREHEN METABOLIC PANEL: CPT

## 2018-03-21 PROCEDURE — 83036 HEMOGLOBIN GLYCOSYLATED A1C: CPT

## 2018-03-21 PROCEDURE — 80061 LIPID PANEL: CPT

## 2018-03-21 PROCEDURE — 36415 COLL VENOUS BLD VENIPUNCTURE: CPT

## 2018-03-21 PROCEDURE — 85025 COMPLETE CBC W/AUTO DIFF WBC: CPT

## 2018-03-27 ENCOUNTER — OFFICE VISIT (OUTPATIENT)
Dept: FAMILY MEDICINE CLINIC | Facility: CLINIC | Age: 58
End: 2018-03-27
Payer: COMMERCIAL

## 2018-03-27 VITALS
WEIGHT: 174 LBS | HEIGHT: 65 IN | RESPIRATION RATE: 17 BRPM | BODY MASS INDEX: 28.99 KG/M2 | SYSTOLIC BLOOD PRESSURE: 140 MMHG | DIASTOLIC BLOOD PRESSURE: 90 MMHG | HEART RATE: 76 BPM | OXYGEN SATURATION: 98 %

## 2018-03-27 DIAGNOSIS — B35.3 TINEA PEDIS OF BOTH FEET: ICD-10-CM

## 2018-03-27 DIAGNOSIS — I10 ESSENTIAL HYPERTENSION: ICD-10-CM

## 2018-03-27 DIAGNOSIS — IMO0001 UNCONTROLLED TYPE 2 DIABETES MELLITUS WITHOUT COMPLICATION, WITHOUT LONG-TERM CURRENT USE OF INSULIN: Primary | ICD-10-CM

## 2018-03-27 PROBLEM — R30.0 DYSURIA: Status: RESOLVED | Noted: 2018-03-08 | Resolved: 2018-03-27

## 2018-03-27 LAB — SL AMB POCT GLUCOSE BLD: 155

## 2018-03-27 PROCEDURE — 82962 GLUCOSE BLOOD TEST: CPT | Performed by: FAMILY MEDICINE

## 2018-03-27 PROCEDURE — 4010F ACE/ARB THERAPY RXD/TAKEN: CPT | Performed by: FAMILY MEDICINE

## 2018-03-27 PROCEDURE — 99214 OFFICE O/P EST MOD 30 MIN: CPT | Performed by: FAMILY MEDICINE

## 2018-03-27 RX ORDER — LISINOPRIL 5 MG/1
5 TABLET ORAL DAILY
Qty: 90 TABLET | Refills: 1 | Status: SHIPPED | OUTPATIENT
Start: 2018-03-27 | End: 2019-01-29 | Stop reason: SDUPTHER

## 2018-03-27 NOTE — ASSESSMENT & PLAN NOTE
Diabetes type 2, uncontrolled fingerstick in the office is 155,   Lab Results   Component Value Date    HGBA1C 9 6 (H) 03/21/2018      he recently started farxiga, he will continue that continue metformin twice a day and he will improve his diet and exercise and will recheck his blood work in 3 months

## 2018-03-27 NOTE — ASSESSMENT & PLAN NOTE
He skin between the toes is looking moist and he needs to use Lamisil cream twice a day for at least 1-2 weeks and keep his feet dry

## 2018-03-27 NOTE — PROGRESS NOTES
Assessment/Plan:    Problem List Items Addressed This Visit        Endocrine    Diabetes mellitus out of control ([facility] [address] ) - Primary     Diabetes type 2, uncontrolled fingerstick in the office is 155,   Lab Results   Component Value Date    HGBA1C 9 6 (H) 03/21/2018      he recently started Unk Yohana, he will continue that continue metformin twice a day and he will improve his diet and exercise and will recheck his blood work in 3 months         Relevant Orders    CBC and differential    Comprehensive metabolic panel    HEMOGLOBIN A1C W/ EAG ESTIMATION    Lipid panel    UA w Reflex to Microscopic w Reflex to Culture -Lab Collect    Microalbumin / creatinine urine ratio    POCT blood glucose       Cardiovascular and Mediastinum    Essential hypertension    Relevant Medications    lisinopril (ZESTRIL) 5 mg tablet       Musculoskeletal and Integument    Tinea pedis of both feet      He skin between the toes is looking moist and he needs to use Lamisil cream twice a day for at least 1-2 weeks and keep his feet dry               Chief Complaint   Patient presents with    Follow-up       Subjective:   Patient ID: Concha Gamino is a 56 y o  male  He is here follow-up on diabetes and he says that he started Unk Yohana taking regularly now he was not taking this medicine for one year,  He was taking metformin regularly and he says he has improved his diet he stopped drinking juices and sugar, and his urine infection is resolved he finished antibiotic he has been drinking more water  He had labs, he also has hyperlipidemia and he takes says statins regularly  Review of Systems   Constitutional: Negative for activity change, appetite change, chills, diaphoresis, fatigue, fever and unexpected weight change     HENT: Negative for congestion, dental problem, ear discharge, ear pain, facial swelling, hearing loss, mouth sores, nosebleeds, postnasal drip, rhinorrhea, sinus pain, sinus pressure, sneezing, sore throat, trouble swallowing and voice change  Eyes: Negative for photophobia, pain, discharge, redness and itching  Respiratory: Negative for cough, chest tightness, shortness of breath and wheezing  Cardiovascular: Negative for chest pain, palpitations and leg swelling  Gastrointestinal: Negative for abdominal distention, abdominal pain, blood in stool, constipation, diarrhea and nausea  Endocrine: Negative for cold intolerance, heat intolerance, polydipsia, polyphagia and polyuria  Genitourinary: Negative for dysuria, flank pain, frequency, hematuria and urgency  Musculoskeletal: Negative for arthralgias, back pain, myalgias and neck pain  Skin: Negative for color change and pallor  Allergic/Immunologic: Negative for environmental allergies and food allergies  Neurological: Negative for dizziness, weakness, light-headedness, numbness and headaches  Hematological: Negative for adenopathy  Does not bruise/bleed easily  Psychiatric/Behavioral: Negative for behavioral problems, sleep disturbance and suicidal ideas  The patient is not nervous/anxious  Objective:  Physical Exam   Constitutional: He is oriented to person, place, and time  He appears well-developed and well-nourished  HENT:   Head: Normocephalic and atraumatic  Right Ear: External ear normal    Left Ear: External ear normal    Nose: Nose normal    Mouth/Throat: Oropharynx is clear and moist  No oropharyngeal exudate  Eyes: Conjunctivae and EOM are normal  Pupils are equal, round, and reactive to light  Right eye exhibits no discharge  Left eye exhibits no discharge  No scleral icterus  Neck: Normal range of motion  Neck supple  No tracheal deviation present  No thyromegaly present  Cardiovascular: Normal rate, regular rhythm and normal heart sounds  No murmur heard  Pulses:       Dorsalis pedis pulses are 2+ on the right side, and 2+ on the left side     Pulmonary/Chest: Effort normal and breath sounds normal  No respiratory distress  He has no wheezes  He has no rales  Abdominal: Soft  Bowel sounds are normal  He exhibits no distension and no mass  There is no tenderness  There is no rebound  Musculoskeletal: Normal range of motion  He exhibits no edema  Feet:   Right Foot:   Skin Integrity: Negative for ulcer, skin breakdown, erythema, warmth, callus or dry skin  Left Foot:   Skin Integrity: Negative for ulcer, skin breakdown, erythema, warmth, callus or dry skin  Lymphadenopathy:     He has no cervical adenopathy  Neurological: He is alert and oriented to person, place, and time  He has normal reflexes  No cranial nerve deficit  Skin: Skin is warm  No rash noted  No erythema  No pallor  Psychiatric: He has a normal mood and affect  His behavior is normal  Judgment and thought content normal    Nursing note and vitals reviewed  Patient's shoes and socks removed  Right Foot/Ankle   Right Foot Inspection  Skin Exam: skin normal skin not intact, no dry skin, no warmth, no callus, no erythema, no maceration, no abnormal color, no pre-ulcer, no ulcer and no callus                          Toe Exam: ROM and strength within normal limits  Sensory   Vibration: intact  Proprioception: intact   Monofilament testing: intact  Vascular  Capillary refills: < 3 seconds  The right DP pulse is 2+  Left Foot/Ankle  Left Foot Inspection  Skin Exam: skin normalskin not intact, no dry skin, no warmth, no erythema, no maceration, normal color, no pre-ulcer, no ulcer and no callus                         Toe Exam: ROM and strength within normal limits                   Sensory   Vibration: intact  Proprioception: intact  Monofilament: intact  Vascular  Capillary refills: < 3 seconds  The left DP pulse is 2+  Assign Risk Category:  ; ;        Risk: 0          Past Surgical History:   Procedure Laterality Date    COLONOSCOPY      last assessed 3/5/2013       Family History   Problem Relation Age of Onset    Diabetes Mother      DM  Diabetes Paternal Grandmother      DM         Current Outpatient Prescriptions:     Dapagliflozin Propanediol (FARXIGA) 10 MG TABS, Take 1 tablet (10 mg total) by mouth daily, Disp: 30 tablet, Rfl: 5    metFORMIN (GLUCOPHAGE) 1000 MG tablet, Take 1 tablet (1,000 mg total) by mouth 2 (two) times a day with meals, Disp: 60 tablet, Rfl: 4    pravastatin (PRAVACHOL) 20 mg tablet, Take 1 tablet (20 mg total) by mouth daily, Disp: 30 tablet, Rfl: 5    lisinopril (ZESTRIL) 5 mg tablet, Take 1 tablet (5 mg total) by mouth daily, Disp: 90 tablet, Rfl: 1    No Known Allergies    Vitals:    03/27/18 1845 03/27/18 1921   BP: 148/86 140/90   BP Location: Left arm    Patient Position: Sitting    Cuff Size: Standard    Pulse: 76    Resp: 17    SpO2: 98%    Weight: 78 9 kg (174 lb)    Height: 5' 5" (1 651 m)        dm

## 2018-04-11 ENCOUNTER — OFFICE VISIT (OUTPATIENT)
Dept: GASTROENTEROLOGY | Facility: AMBULARY SURGERY CENTER | Age: 58
End: 2018-04-11
Payer: COMMERCIAL

## 2018-04-11 VITALS
BODY MASS INDEX: 29.32 KG/M2 | TEMPERATURE: 98 F | WEIGHT: 176 LBS | HEIGHT: 65 IN | DIASTOLIC BLOOD PRESSURE: 76 MMHG | SYSTOLIC BLOOD PRESSURE: 132 MMHG | HEART RATE: 65 BPM

## 2018-04-11 DIAGNOSIS — Z86.010 HISTORY OF COLON POLYPS: Primary | ICD-10-CM

## 2018-04-11 DIAGNOSIS — K63.5 COLORECTAL POLYP DETECTED ON COLONOSCOPY: ICD-10-CM

## 2018-04-11 PROBLEM — Z86.0100 HISTORY OF COLON POLYPS: Status: ACTIVE | Noted: 2018-04-11

## 2018-04-11 PROCEDURE — 99204 OFFICE O/P NEW MOD 45 MIN: CPT | Performed by: INTERNAL MEDICINE

## 2018-04-11 NOTE — LETTER
April 11, 2018     Joanie Stapleton MD  [address] [facility]  [place] [address]    Patient: Luke Mario   YOB: 1960   Date of Visit: 4/11/2018       Dear Dr Lopez Blind:    Thank you for referring Cesilia Bowman to me for evaluation  Below are my notes for this consultation  If you have questions, please do not hesitate to call me  I look forward to following your patient along with you           Sincerely,        Hermes Booth MD        CC: No Recipients

## 2018-04-11 NOTE — PROGRESS NOTES
Consultation - [facility]  Nabilia Dance 1960 male         Chief Complaint:  History of colon polyps    HPI:  53-year-old male with history of diabetes mellitus, hypertension was referred for colonoscopy  Patient gives history of colon polyps on his previous colonoscopy about 5 years ago  Patient has regular bowel movements and denies any blood or mucus in the stool  Appetite is good and denies any recent weight loss  Denies any abdominal pain, nausea, or vomiting  Has no heartburn or acid reflux  Denies any difficulty swallowing  REVIEW OF SYSTEMS: Review of Systems   Constitutional: Negative for activity change, appetite change, chills, diaphoresis, fatigue, fever and unexpected weight change  HENT: Negative for ear discharge, ear pain, facial swelling, hearing loss, nosebleeds, sore throat, tinnitus and voice change  Eyes: Negative for pain, discharge, redness, itching and visual disturbance  Respiratory: Negative for apnea, cough, chest tightness, shortness of breath and wheezing  Cardiovascular: Negative for chest pain and palpitations  Gastrointestinal:        As noted in HPI   Endocrine: Negative for cold intolerance, heat intolerance and polyuria  Genitourinary: Negative for difficulty urinating, dysuria, flank pain, hematuria and urgency  Musculoskeletal: Negative for arthralgias, back pain, gait problem, joint swelling and myalgias  Skin: Negative for rash and wound  Neurological: Negative for dizziness, tremors, seizures, speech difficulty, light-headedness, numbness and headaches  Hematological: Negative for adenopathy  Does not bruise/bleed easily  Psychiatric/Behavioral: Negative for agitation, behavioral problems and confusion  The patient is not nervous/anxious           Past Medical History:   Diagnosis Date    Benign essential hypertension     last assessed 10/1/2013    Diabetes mellitus ([facility] [address] )     Hyperlipidemia     last assessed 10/1/2013    Polyp of sigmoid colon       Past Surgical History:   Procedure Laterality Date    COLONOSCOPY      last assessed 3/5/2013     Social History     Social History    Marital status: /Civil Union     Spouse name: N/A    Number of children: N/A    Years of education: N/A     Occupational History    construction      Social History Main Topics    Smoking status: Never Smoker    Smokeless tobacco: Never Used      Comment: per allscripts- 'former smoker'    Alcohol use Yes      Comment: weekend beer drinker    Drug use: No    Sexual activity: Not on file     Other Topics Concern    Not on file     Social History Narrative    No narrative on file     Family History   Problem Relation Age of Onset    Diabetes Mother      DM    Diabetes Paternal Grandmother      DM     Patient has no known allergies  Current Outpatient Prescriptions   Medication Sig Dispense Refill    Dapagliflozin Propanediol (FARXIGA) 10 MG TABS Take 1 tablet (10 mg total) by mouth daily 30 tablet 5    lisinopril (ZESTRIL) 5 mg tablet Take 1 tablet (5 mg total) by mouth daily 90 tablet 1    metFORMIN (GLUCOPHAGE) 1000 MG tablet Take 1 tablet (1,000 mg total) by mouth 2 (two) times a day with meals 60 tablet 4    pravastatin (PRAVACHOL) 20 mg tablet Take 1 tablet (20 mg total) by mouth daily 30 tablet 5     No current facility-administered medications for this visit  Blood pressure 132/76, pulse 65, temperature 98 °F (36 7 °C), temperature source Tympanic, height 5' 5" (1 651 m), weight 79 8 kg (176 lb)  PHYSICAL EXAM: Physical Exam   Constitutional: He is oriented to person, place, and time  He appears well-developed  HENT:   Head: Normocephalic and atraumatic  Mouth/Throat: Oropharynx is clear and moist    Eyes: Conjunctivae are normal  Pupils are equal, round, and reactive to light  Right eye exhibits no discharge  Left eye exhibits no discharge  No scleral icterus  Neck: Neck supple  No JVD present  No tracheal deviation present  No thyromegaly present  Cardiovascular: Normal rate, regular rhythm, normal heart sounds and intact distal pulses  Exam reveals no gallop and no friction rub  No murmur heard  Pulmonary/Chest: Effort normal and breath sounds normal  No respiratory distress  He has no wheezes  He has no rales  He exhibits no tenderness  Abdominal: Soft  Bowel sounds are normal  He exhibits no distension and no mass  There is no tenderness  There is no rebound and no guarding  No hernia  Musculoskeletal: He exhibits no edema  Lymphadenopathy:     He has no cervical adenopathy  Neurological: He is alert and oriented to person, place, and time  Skin: Skin is warm and dry  No rash noted  No erythema  Psychiatric: He has a normal mood and affect  His behavior is normal  Thought content normal         Lab Results   Component Value Date    WBC 8 25 03/21/2018    HGB 15 7 03/21/2018    HCT 44 0 03/21/2018    MCV 92 03/21/2018     (H) 03/21/2018     Lab Results   Component Value Date    GLUCOSE 311 (H) 01/03/2018    CALCIUM 8 9 03/21/2018     03/21/2018    K 4 3 03/21/2018    CO2 27 03/21/2018     03/21/2018    BUN 11 03/21/2018    CREATININE 0 99 03/21/2018     Lab Results   Component Value Date    ALT 29 03/21/2018    AST 18 03/21/2018    ALKPHOS 61 03/21/2018    BILITOT 1 25 (H) 03/21/2018     No results found for: INR, PROTIME    Xr Chest 2 Views    Result Date: 1/3/2018  Impression: No active pulmonary disease  Workstation performed: [id number]       ASSESSMENT & PLAN:    No problem-specific Assessment & Plan notes found for this encounter

## 2018-04-23 ENCOUNTER — ANESTHESIA EVENT (OUTPATIENT)
Dept: PERIOP | Facility: AMBULARY SURGERY CENTER | Age: 58
End: 2018-04-23
Payer: COMMERCIAL

## 2018-05-02 ENCOUNTER — ANESTHESIA (OUTPATIENT)
Dept: PERIOP | Facility: AMBULARY SURGERY CENTER | Age: 58
End: 2018-05-02
Payer: COMMERCIAL

## 2018-05-02 ENCOUNTER — HOSPITAL ENCOUNTER (OUTPATIENT)
Facility: AMBULARY SURGERY CENTER | Age: 58
Setting detail: OUTPATIENT SURGERY
Discharge: HOME | End: 2018-05-02
Attending: INTERNAL MEDICINE | Admitting: INTERNAL MEDICINE
Payer: COMMERCIAL

## 2018-05-02 VITALS
HEIGHT: 65 IN | RESPIRATION RATE: 18 BRPM | SYSTOLIC BLOOD PRESSURE: 115 MMHG | TEMPERATURE: 96.5 F | WEIGHT: 171.38 LBS | HEART RATE: 64 BPM | BODY MASS INDEX: 28.55 KG/M2 | OXYGEN SATURATION: 97 % | DIASTOLIC BLOOD PRESSURE: 68 MMHG

## 2018-05-02 DIAGNOSIS — K63.5 COLORECTAL POLYP DETECTED ON COLONOSCOPY: ICD-10-CM

## 2018-05-02 DIAGNOSIS — Z86.010 HISTORY OF COLON POLYPS: ICD-10-CM

## 2018-05-02 LAB — GLUCOSE SERPL-MCNC: 129 MG/DL (ref 65–140)

## 2018-05-02 PROCEDURE — 82948 REAGENT STRIP/BLOOD GLUCOSE: CPT

## 2018-05-02 PROCEDURE — 88305 TISSUE EXAM BY PATHOLOGIST: CPT | Performed by: PATHOLOGY

## 2018-05-02 PROCEDURE — 45385 COLONOSCOPY W/LESION REMOVAL: CPT | Performed by: INTERNAL MEDICINE

## 2018-05-02 RX ORDER — ONDANSETRON 2 MG/ML
4 INJECTION INTRAMUSCULAR; INTRAVENOUS ONCE AS NEEDED
Status: DISCONTINUED | OUTPATIENT
Start: 2018-05-02 | End: 2018-05-02 | Stop reason: HOSPADM

## 2018-05-02 RX ORDER — MEPERIDINE HYDROCHLORIDE 25 MG/ML
12.5 INJECTION INTRAMUSCULAR; INTRAVENOUS; SUBCUTANEOUS AS NEEDED
Status: DISCONTINUED | OUTPATIENT
Start: 2018-05-02 | End: 2018-05-02 | Stop reason: HOSPADM

## 2018-05-02 RX ORDER — ALBUTEROL SULFATE 2.5 MG/3ML
2.5 SOLUTION RESPIRATORY (INHALATION) ONCE AS NEEDED
Status: DISCONTINUED | OUTPATIENT
Start: 2018-05-02 | End: 2018-05-02 | Stop reason: HOSPADM

## 2018-05-02 RX ORDER — SODIUM CHLORIDE, SODIUM LACTATE, POTASSIUM CHLORIDE, CALCIUM CHLORIDE 600; 310; 30; 20 MG/100ML; MG/100ML; MG/100ML; MG/100ML
125 INJECTION, SOLUTION INTRAVENOUS CONTINUOUS
Status: DISCONTINUED | OUTPATIENT
Start: 2018-05-02 | End: 2018-05-02 | Stop reason: HOSPADM

## 2018-05-02 RX ORDER — PROPOFOL 10 MG/ML
INJECTION, EMULSION INTRAVENOUS AS NEEDED
Status: DISCONTINUED | OUTPATIENT
Start: 2018-05-02 | End: 2018-05-02 | Stop reason: SURG

## 2018-05-02 RX ADMIN — PROPOFOL 20 MG: 10 INJECTION, EMULSION INTRAVENOUS at 09:26

## 2018-05-02 RX ADMIN — PROPOFOL 30 MG: 10 INJECTION, EMULSION INTRAVENOUS at 09:24

## 2018-05-02 RX ADMIN — SODIUM CHLORIDE, SODIUM LACTATE, POTASSIUM CHLORIDE, AND CALCIUM CHLORIDE: .6; .31; .03; .02 INJECTION, SOLUTION INTRAVENOUS at 09:19

## 2018-05-02 RX ADMIN — PROPOFOL 100 MG: 10 INJECTION, EMULSION INTRAVENOUS at 09:20

## 2018-05-02 RX ADMIN — SODIUM CHLORIDE, SODIUM LACTATE, POTASSIUM CHLORIDE, AND CALCIUM CHLORIDE: .6; .31; .03; .02 INJECTION, SOLUTION INTRAVENOUS at 08:43

## 2018-05-02 RX ADMIN — PROPOFOL 20 MG: 10 INJECTION, EMULSION INTRAVENOUS at 09:27

## 2018-05-02 RX ADMIN — PROPOFOL 30 MG: 10 INJECTION, EMULSION INTRAVENOUS at 09:22

## 2018-05-02 NOTE — H&P
History and Physical -  [facility]  Luke Mario 56 y o  male MRN: [id number]        HPI: 61-year-old male with history of diabetes mellitus, hypertension was referred for colonoscopy  Patient gives history of colon polyps on his previous colonoscopy about 5 years ago  Patient has regular bowel movements and denies any blood or mucus in the stool  Appetite is good and denies any recent weight loss  Denies any abdominal pain, nausea, or vomiting  Has no heartburn or acid reflux  Denies any difficulty swallowing  Historical Information   Past Medical History:   Diagnosis Date    Benign essential hypertension     last assessed 10/1/2013    Diabetes mellitus ([address] )     Hyperlipidemia     last assessed 10/1/2013    Polyp of sigmoid colon      Past Surgical History:   Procedure Laterality Date    COLONOSCOPY      last assessed 3/5/2013     Social History   History   Alcohol Use    Yes     Comment: weekend beer drinker     History   Drug Use No     History   Smoking Status    Former Smoker   Smokeless Tobacco    Never Used     Comment: per allscripts- 'former smoker'     Family History   Problem Relation Age of Onset    Diabetes Mother      DM    Diabetes Paternal Grandmother      DM       Meds/Allergies     Prescriptions Prior to Admission   Medication    Dapagliflozin Propanediol (FARXIGA) 10 MG TABS    lisinopril (ZESTRIL) 5 mg tablet    metFORMIN (GLUCOPHAGE) 1000 MG tablet    pravastatin (PRAVACHOL) 20 mg tablet       No Known Allergies    Objective     Blood pressure 131/71, pulse 67, temperature (!) 97 °F (36 1 °C), temperature source Temporal, resp  rate 18, height 5' 5" (1 651 m), weight 77 7 kg (171 lb 6 oz), SpO2 99 %      PHYSICAL EXAM:    Gen: NAD  CV: S1 & S2 normal, RRR  CHEST: Clear to auscultate  ABD: soft, NT/ND, good bowel sounds  EXT: no edema    ASSESSMENT:     History of colon polyps    PLAN:    Colonoscopy

## 2018-05-02 NOTE — OP NOTE
COLONOSCOPY    PROCEDURE: Colonoscopy/ Polypectomy (Snare Cautery)  Polypectomny (Cold Biopsy)    INDICATIONS: History of Colon Polyps    POST-OP DIAGNOSIS: See the impression below    SEDATION: Monitored anesthesia care, check anesthesia records    PHYSICAL EXAM:    /71   Pulse 67   Temp (!) 97 °F (36 1 °C) (Temporal)   Resp 18   Ht 5' 5" (1 651 m)   Wt 77 7 kg (171 lb 6 oz)   SpO2 99%   BMI 28 52 kg/m²   Body mass index is 28 52 kg/m²  General: NAD  Heart: S1 & S2 normal, RRR  Lungs: CTA, No rales or rhonchi  Abdomen: Soft, nontender, nondistended, good bowel sounds    CONSENT:  Informed consent was obtained for the procedure, including sedation after explaining the risks and benefits of the procedure  Risks including but not limited to bleeding, perforation, infection, aspiration were discussed in detail  Also explained about less than 100%$ sensitivity with the exam and other alternatives  PREPARATION:   EKG tracing, pulse oximetry, blood pressure were monitored throughout the procedure  Patient was identified by myself both verbally and by visual inspection of ID band  DESCRIPTION:   Patient was placed in the left lateral decubitus position and was sedated with the above medication  Digital rectal examination was performed  The colonoscope was introduced in to the anal canal and advanced up to cecum, which was identified by the appendiceal orifice and IC valve  A careful inspection was made as the colonoscope was withdrawn, including a retroflexed view of the rectum; findings and interventions are described below  Appropriate photodocumentation was obtained  The quality of the colonic preparation was adequate  FINDINGS:    1  Cecum and ileocecal valve-normal mucosa    2  Ascending colon-1 cm polyp was removed with snare cautery    3  Descending colon-diminutive polyp was removed with cold biopsy forceps    4    Rectum and anal canal-diminutive polyp was removed with snare cautery    5  Small internal hemorrhoids         IMPRESSIONS:      As above    RECOMMENDATIONS:    Check pathology    COMPLICATIONS:  None; patient tolerated the procedure well      DISPOSITION: PACU           CONDITION: Stable

## 2018-05-02 NOTE — ANESTHESIA PREPROCEDURE EVALUATION
Review of Systems/Medical History  Patient summary reviewed        Cardiovascular  Negative cardio ROS Exercise tolerance: good,  Hyperlipidemia, Hypertension ,    Pulmonary  Negative pulmonary ROS        GI/Hepatic  Negative GI/hepatic ROS          Negative  ROS        Endo/Other  Negative endo/other ROS Diabetes well controlled type 2 ,      GYN  Negative gynecology ROS          Hematology  Negative hematology ROS      Musculoskeletal  Negative musculoskeletal ROS        Neurology  Negative neurology ROS      Psychology   Negative psychology ROS              Physical Exam    Airway    Mallampati score: II  TM Distance: >3 FB  Neck ROM: full     Dental   No notable dental hx     Cardiovascular  Comment: Negative ROS,     Pulmonary      Other Findings        Anesthesia Plan  ASA Score- 2     Anesthesia Type- IV sedation with anesthesia with ASA Monitors  Additional Monitors:   Airway Plan:     Comment: I have seen the patient and reviewed the history  Patient to receive IV sedation with full ASA monitors  Risks discussed with the patient, consent signed        Plan Factors-    Induction- intravenous  Postoperative Plan-     Informed Consent- Anesthetic plan and risks discussed with patient  I personally reviewed this patient with the CRNA  Discussed and agreed on the Anesthesia Plan with the CRNA  Ludmila Mota

## 2018-09-14 ENCOUNTER — TELEPHONE (OUTPATIENT)
Dept: FAMILY MEDICINE CLINIC | Facility: CLINIC | Age: 58
End: 2018-09-14

## 2018-09-17 ENCOUNTER — TELEPHONE (OUTPATIENT)
Dept: FAMILY MEDICINE CLINIC | Facility: CLINIC | Age: 58
End: 2018-09-17

## 2018-09-17 DIAGNOSIS — E11.65 TYPE 2 DIABETES MELLITUS WITH HYPERGLYCEMIA, WITHOUT LONG-TERM CURRENT USE OF INSULIN (HCC): Primary | ICD-10-CM

## 2018-09-17 NOTE — Clinical Note
Patient is on farxiga and needs prior authorization, So I changed the prescription to [address] [place] [address] which is under his formulary, and stop farxiga Please inform the patient and schedule him for follow-up

## 2018-11-02 DIAGNOSIS — E78.2 MIXED HYPERLIPIDEMIA: ICD-10-CM

## 2018-11-02 DIAGNOSIS — E11.9 TYPE 2 DIABETES MELLITUS WITHOUT COMPLICATION, WITHOUT LONG-TERM CURRENT USE OF INSULIN (HCC): ICD-10-CM

## 2018-11-04 RX ORDER — PRAVASTATIN SODIUM 20 MG
TABLET ORAL
Qty: 30 TABLET | Refills: 5 | Status: SHIPPED | OUTPATIENT
Start: 2018-11-04 | End: 2019-01-29 | Stop reason: SDUPTHER

## 2019-01-25 LAB
ALBUMIN SERPL-MCNC: 4.8 G/DL (ref 3.5–5.5)
ALBUMIN/CREAT UR: 5.5 MG/G CREAT (ref 0–30)
ALBUMIN/GLOB SERPL: 1.7 {RATIO} (ref 1.2–2.2)
ALP SERPL-CCNC: 65 IU/L (ref 39–117)
ALT SERPL-CCNC: 21 IU/L (ref 0–44)
APPEARANCE UR: CLEAR
AST SERPL-CCNC: 13 IU/L (ref 0–40)
BACTERIA URNS QL MICRO: NORMAL
BASOPHILS # BLD AUTO: 0.1 X10E3/UL (ref 0–0.2)
BASOPHILS NFR BLD AUTO: 1 %
BILIRUB SERPL-MCNC: 0.7 MG/DL (ref 0–1.2)
BILIRUB UR QL STRIP: NEGATIVE
BUN SERPL-MCNC: 12 MG/DL (ref 6–24)
BUN/CREAT SERPL: 10 (ref 9–20)
CALCIUM SERPL-MCNC: 10.1 MG/DL (ref 8.7–10.2)
CHLORIDE SERPL-SCNC: 100 MMOL/L (ref 96–106)
CHOLEST SERPL-MCNC: 157 MG/DL (ref 100–199)
CO2 SERPL-SCNC: 24 MMOL/L (ref 20–29)
COLOR UR: YELLOW
CREAT SERPL-MCNC: 1.15 MG/DL (ref 0.76–1.27)
CREAT UR-MCNC: 157 MG/DL
EOSINOPHIL # BLD AUTO: 0.3 X10E3/UL (ref 0–0.4)
EOSINOPHIL NFR BLD AUTO: 3 %
EPI CELLS #/AREA URNS HPF: NORMAL /HPF
ERYTHROCYTE [DISTWIDTH] IN BLOOD BY AUTOMATED COUNT: 12.9 % (ref 12.3–15.4)
EST. AVERAGE GLUCOSE BLD GHB EST-MCNC: 186 MG/DL
GLOBULIN SER-MCNC: 2.8 G/DL (ref 1.5–4.5)
GLUCOSE SERPL-MCNC: 184 MG/DL (ref 65–99)
GLUCOSE UR QL: NEGATIVE
HBA1C MFR BLD: 8.1 % (ref 4.8–5.6)
HCT VFR BLD AUTO: 43.2 % (ref 37.5–51)
HDLC SERPL-MCNC: 40 MG/DL
HGB BLD-MCNC: 14.9 G/DL (ref 13–17.7)
HGB UR QL STRIP: NEGATIVE
IMM GRANULOCYTES # BLD: 0 X10E3/UL (ref 0–0.1)
IMM GRANULOCYTES NFR BLD: 0 %
KETONES UR QL STRIP: NEGATIVE
LABCORP COMMENT: NORMAL
LDLC SERPL CALC-MCNC: 96 MG/DL (ref 0–99)
LEUKOCYTE ESTERASE UR QL STRIP: NEGATIVE
LYMPHOCYTES # BLD AUTO: 3.9 X10E3/UL (ref 0.7–3.1)
LYMPHOCYTES NFR BLD AUTO: 36 %
MCH RBC QN AUTO: 32.4 PG (ref 26.6–33)
MCHC RBC AUTO-ENTMCNC: 34.5 G/DL (ref 31.5–35.7)
MCV RBC AUTO: 94 FL (ref 79–97)
MICRO URNS: NORMAL
MICRO URNS: NORMAL
MICROALBUMIN UR-MCNC: 8.7 UG/ML
MONOCYTES # BLD AUTO: 0.7 X10E3/UL (ref 0.1–0.9)
MONOCYTES NFR BLD AUTO: 7 %
MUCOUS THREADS URNS QL MICRO: PRESENT
NEUTROPHILS # BLD AUTO: 5.8 X10E3/UL (ref 1.4–7)
NEUTROPHILS NFR BLD AUTO: 53 %
NITRITE UR QL STRIP: NEGATIVE
PH UR STRIP: 6.5 [PH] (ref 5–7.5)
PLATELET # BLD AUTO: 499 X10E3/UL (ref 150–379)
POTASSIUM SERPL-SCNC: 4.8 MMOL/L (ref 3.5–5.2)
PROT SERPL-MCNC: 7.6 G/DL (ref 6–8.5)
PROT UR QL STRIP: NEGATIVE
RBC # BLD AUTO: 4.6 X10E6/UL (ref 4.14–5.8)
RBC #/AREA URNS HPF: NORMAL /HPF
SL AMB EGFR AFRICAN AMERICAN: 82 ML/MIN/1.73
SL AMB EGFR NON AFRICAN AMERICAN: 71 ML/MIN/1.73
SL AMB URINALYSIS REFLEX: NORMAL
SL AMB VLDL CHOLESTEROL CALC: 21 MG/DL (ref 5–40)
SODIUM SERPL-SCNC: 140 MMOL/L (ref 134–144)
SP GR UR: 1.02 (ref 1–1.03)
TRIGL SERPL-MCNC: 107 MG/DL (ref 0–149)
UROBILINOGEN UR STRIP-ACNC: 0.2 EU/DL (ref 0.2–1)
WBC # BLD AUTO: 10.7 X10E3/UL (ref 3.4–10.8)
WBC #/AREA URNS HPF: NORMAL /HPF

## 2019-01-29 ENCOUNTER — OFFICE VISIT (OUTPATIENT)
Dept: FAMILY MEDICINE CLINIC | Facility: CLINIC | Age: 59
End: 2019-01-29
Payer: COMMERCIAL

## 2019-01-29 VITALS
BODY MASS INDEX: 31.32 KG/M2 | SYSTOLIC BLOOD PRESSURE: 160 MMHG | DIASTOLIC BLOOD PRESSURE: 86 MMHG | HEART RATE: 94 BPM | OXYGEN SATURATION: 98 % | WEIGHT: 188 LBS | HEIGHT: 65 IN | RESPIRATION RATE: 16 BRPM

## 2019-01-29 DIAGNOSIS — E11.9 TYPE 2 DIABETES MELLITUS WITHOUT COMPLICATION, WITHOUT LONG-TERM CURRENT USE OF INSULIN (HCC): ICD-10-CM

## 2019-01-29 DIAGNOSIS — E78.2 MIXED HYPERLIPIDEMIA: Primary | ICD-10-CM

## 2019-01-29 DIAGNOSIS — E11.65 TYPE 2 DIABETES MELLITUS WITH HYPERGLYCEMIA, WITHOUT LONG-TERM CURRENT USE OF INSULIN (HCC): Primary | ICD-10-CM

## 2019-01-29 DIAGNOSIS — E11.65 TYPE 2 DIABETES MELLITUS WITH HYPERGLYCEMIA, WITHOUT LONG-TERM CURRENT USE OF INSULIN (HCC): ICD-10-CM

## 2019-01-29 DIAGNOSIS — I10 ESSENTIAL HYPERTENSION: ICD-10-CM

## 2019-01-29 PROCEDURE — 99214 OFFICE O/P EST MOD 30 MIN: CPT | Performed by: FAMILY MEDICINE

## 2019-01-29 PROCEDURE — 4010F ACE/ARB THERAPY RXD/TAKEN: CPT | Performed by: FAMILY MEDICINE

## 2019-01-29 RX ORDER — GLIPIZIDE 5 MG/1
5 TABLET, FILM COATED, EXTENDED RELEASE ORAL DAILY
Qty: 90 TABLET | Refills: 1 | Status: SHIPPED | OUTPATIENT
Start: 2019-01-29 | End: 2019-02-19 | Stop reason: DRUGHIGH

## 2019-01-29 RX ORDER — PRAVASTATIN SODIUM 20 MG
20 TABLET ORAL DAILY
Qty: 90 TABLET | Refills: 1 | Status: SHIPPED | OUTPATIENT
Start: 2019-01-29

## 2019-01-29 RX ORDER — LISINOPRIL 5 MG/1
5 TABLET ORAL DAILY
Qty: 90 TABLET | Refills: 1 | Status: SHIPPED | OUTPATIENT
Start: 2019-01-29 | End: 2020-01-05

## 2019-01-29 NOTE — ASSESSMENT & PLAN NOTE
Lab Results   Component Value Date    HGBA1C 8 1 (H) 01/24/2019    He is only taking metformin and he says he is not taking Jardiance as he never got the prescription from the pharmacy and he does not check his blood sugar, advised to be more compliant with his medications, he needs to have regular follow-up and will control it blood sugar better and blood pressure better, advised to lose weight    No results for input(s): POCGLU in the last 72 hours      Blood Sugar Average: Last 72 hrs:

## 2019-01-29 NOTE — ASSESSMENT & PLAN NOTE
Hypertension not well controlled as a he is out of his medicine lisinopril for many months and he is not taking it    I refilled the prescription and he should take medicine regularly and needs to be recheck it in few weeks

## 2019-01-29 NOTE — ASSESSMENT & PLAN NOTE
Hyperlipidemia as well controlled he will continue pravastatin 20 mg daily and refill is given and will recheck labs in 3 months

## 2019-01-29 NOTE — PROGRESS NOTES
Pharmacy called that Johanne and Tammi Jaeger are more than 400 dollar each    So I will switch to glipizide 5 milligram daily and he will continue metformin

## 2019-01-29 NOTE — PROGRESS NOTES
Assessment/Plan:    Problem List Items Addressed This Visit        Endocrine    Type 2 diabetes mellitus without complication, without long-term current use of insulin ([facility] [address] )     Lab Results   Component Value Date    HGBA1C 8 1 (H) 01/24/2019    He is only taking metformin and he says he is not taking Jardiance as he never got the prescription from the pharmacy and he does not check his blood sugar, advised to be more compliant with his medications, he needs to have regular follow-up and will control it blood sugar better and blood pressure better, advised to lose weight    No results for input(s): POCGLU in the last 72 hours  Blood Sugar Average: Last 72 hrs:           Relevant Medications    Empagliflozin (JARDIANCE) 25 MG TABS    metFORMIN (GLUCOPHAGE) 1000 MG tablet    sitaGLIPtin (JANUVIA) 100 mg tablet    Other Relevant Orders    Ambulatory referral to Ophthalmology       Cardiovascular and Mediastinum    Essential hypertension     Hypertension not well controlled as a he is out of his medicine lisinopril for many months and he is not taking it  I refilled the prescription and he should take medicine regularly and needs to be recheck it in few weeks         Relevant Medications    lisinopril (ZESTRIL) 5 mg tablet       Other    Hyperlipidemia - Primary     Hyperlipidemia as well controlled he will continue pravastatin 20 mg daily and refill is given and will recheck labs in 3 months         Relevant Medications    pravastatin (PRAVACHOL) 20 mg tablet    Other Relevant Orders    Lipid panel          Chief Complaint   Patient presents with    Follow-up       Subjective:   Patient ID: Toan Her is a 66 y o  male  He is here follow-up on diabetes, he is only taking metformin and he thinks the medicine probably was expensive that is why did not , he is also taking medicine for hyperlipidemia but he is not taking anything for blood pressure, lisinopril was given for hypertension    He denies any headache chest pain shortness of breath or any change in urine bowel movements  He has no leg swelling, he does not check his blood sugar  He has his recent labs done, he is due for his eye exam         Review of Systems   Patient's shoes and socks removed  Right Foot/Ankle   Right Foot Inspection  Skin Exam: skin normal skin not intact, no dry skin, no warmth, no callus, no erythema, no maceration, no abnormal color, no pre-ulcer, no ulcer and no callus                          Toe Exam: ROM and strength within normal limits  Sensory   Vibration: intact  Proprioception: intact   Monofilament testing: intact  Vascular  Capillary refills: < 3 seconds  The right DP pulse is 2+  Left Foot/Ankle  Left Foot Inspection  Skin Exam: skin normalskin not intact, no dry skin, no warmth, no erythema, no maceration, normal color, no pre-ulcer, no ulcer and no callus                         Toe Exam: ROM and strength within normal limits                   Sensory   Vibration: intact  Proprioception: intact  Monofilament: intact  Vascular  Capillary refills: < 3 seconds  The left DP pulse is 2+  Assign Risk Category:  ; ;        Risk: 0      Objective:  Physical Exam   Constitutional: He is oriented to person, place, and time  He appears well-developed and well-nourished  HENT:   Head: Normocephalic and atraumatic  Nose: Nose normal    Mouth/Throat: Oropharynx is clear and moist  No oropharyngeal exudate  Eyes: Pupils are equal, round, and reactive to light  Conjunctivae and EOM are normal  Right eye exhibits no discharge  Left eye exhibits no discharge  No scleral icterus  Neck: Normal range of motion  Neck supple  No tracheal deviation present  No thyromegaly present  Cardiovascular: Normal rate, regular rhythm and normal heart sounds  No murmur heard  Pulses:       Dorsalis pedis pulses are 2+ on the right side, and 2+ on the left side     Pulmonary/Chest: Effort normal and breath sounds normal  No respiratory distress  He has no wheezes  He has no rales  Abdominal: Soft  Bowel sounds are normal  He exhibits no distension and no mass  There is no tenderness  There is no rebound  Musculoskeletal: Normal range of motion  He exhibits no edema  Feet:   Right Foot:   Skin Integrity: Negative for ulcer, skin breakdown, erythema, warmth, callus or dry skin  Left Foot:   Skin Integrity: Negative for ulcer, skin breakdown, erythema, warmth, callus or dry skin  Lymphadenopathy:     He has no cervical adenopathy  Neurological: He is alert and oriented to person, place, and time  He has normal reflexes  No cranial nerve deficit  Skin: Skin is warm  No rash noted  No erythema  No pallor  Psychiatric: He has a normal mood and affect  His behavior is normal  Judgment and thought content normal    Vitals reviewed  Past Surgical History:   Procedure Laterality Date    COLONOSCOPY      last assessed 3/5/2013    SD COLONOSCOPY FLX DX W/COLLJ SPEC WHEN PFRMD N/A 5/2/2018    Procedure: COLONOSCOPY;  Surgeon: Merissa Colin MD;  Location: [facility];   Service: Gastroenterology       Family History   Problem Relation Age of Onset    Diabetes Mother         DM    Diabetes Paternal Grandmother         DM         Current Outpatient Prescriptions:     Empagliflozin (JARDIANCE) 25 MG TABS, Take 1 tablet (25 mg total) by mouth every morning, Disp: 90 tablet, Rfl: 1    lisinopril (ZESTRIL) 5 mg tablet, Take 1 tablet (5 mg total) by mouth daily, Disp: 90 tablet, Rfl: 1    metFORMIN (GLUCOPHAGE) 1000 MG tablet, Take 1 tablet (1,000 mg total) by mouth 2 (two) times a day with meals, Disp: 180 tablet, Rfl: 1    pravastatin (PRAVACHOL) 20 mg tablet, Take 1 tablet (20 mg total) by mouth daily, Disp: 90 tablet, Rfl: 1    sitaGLIPtin (JANUVIA) 100 mg tablet, Take 1 tablet (100 mg total) by mouth daily, Disp: 90 tablet, Rfl: 1    No Known Allergies    Vitals:    01/29/19 1433   BP: 160/86   Pulse: 94   Resp: 16 SpO2: 98%   Weight: 85 3 kg (188 lb)   Height: 5' 5" (1 651 m)

## 2019-02-19 ENCOUNTER — OFFICE VISIT (OUTPATIENT)
Dept: FAMILY MEDICINE CLINIC | Facility: CLINIC | Age: 59
End: 2019-02-19
Payer: COMMERCIAL

## 2019-02-19 VITALS
RESPIRATION RATE: 16 BRPM | DIASTOLIC BLOOD PRESSURE: 70 MMHG | SYSTOLIC BLOOD PRESSURE: 130 MMHG | BODY MASS INDEX: 30.66 KG/M2 | HEIGHT: 65 IN | HEART RATE: 104 BPM | WEIGHT: 184 LBS | OXYGEN SATURATION: 98 %

## 2019-02-19 DIAGNOSIS — E11.9 TYPE 2 DIABETES MELLITUS WITHOUT COMPLICATION, WITHOUT LONG-TERM CURRENT USE OF INSULIN (HCC): Primary | ICD-10-CM

## 2019-02-19 PROCEDURE — 99213 OFFICE O/P EST LOW 20 MIN: CPT | Performed by: FAMILY MEDICINE

## 2019-02-19 PROCEDURE — 3008F BODY MASS INDEX DOCD: CPT | Performed by: FAMILY MEDICINE

## 2019-02-19 PROCEDURE — 82948 REAGENT STRIP/BLOOD GLUCOSE: CPT | Performed by: FAMILY MEDICINE

## 2019-02-19 RX ORDER — GLIPIZIDE 5 MG/1
5 TABLET ORAL
Qty: 60 TABLET | Refills: 5 | Status: SHIPPED | OUTPATIENT
Start: 2019-02-19 | End: 2020-08-03

## 2019-02-19 NOTE — PROGRESS NOTES
Assessment/Plan:    Problem List Items Addressed This Visit        Endocrine    Type 2 diabetes mellitus without complication, without long-term current use of insulin ([facility] ) - Primary     Lab Results   Component Value Date    HGBA1C 8 1 (H) 01/24/2019     Fingerstick blood sugar in the office is 232, I will increase glipizide 5 mg twice a day and he will continue metformin his insurance does not cover Johanne and Alexandra Huff, he will get his labs and follow-up in 2 months               Relevant Medications    glipiZIDE (GLUCOTROL) 5 mg tablet    Other Relevant Orders    POCT blood glucose          Chief Complaint   Patient presents with    Follow-up       Subjective:   Patient ID: Douglas Molina is a 66 y o  male  He is here for follow-up on his diabetes, last time his A1c was above 8, glipizide 5 mg was added in his prescription Jardiance was not covered by the insurance  He is currently taking metformin 1000 mg twice a day and he is also taking glipizide 5 mg in the morning and he does not check his blood sugar is trying to eat less carb  He has no side effects from new medication he denies any sweating or low blood sugar,      Review of Systems   Constitutional: Negative for activity change, appetite change, chills, diaphoresis, fatigue, fever and unexpected weight change  HENT: Negative for congestion, dental problem, ear discharge, ear pain, facial swelling, hearing loss, mouth sores, nosebleeds, postnasal drip, rhinorrhea, sinus pressure, sinus pain, sneezing, sore throat, trouble swallowing and voice change  Eyes: Negative for photophobia, pain, discharge, redness and itching  Respiratory: Negative for cough, chest tightness, shortness of breath and wheezing  Cardiovascular: Negative for chest pain, palpitations and leg swelling  Gastrointestinal: Negative for abdominal distention, abdominal pain, blood in stool, constipation, diarrhea and nausea     Endocrine: Negative for cold intolerance, heat intolerance, polydipsia, polyphagia and polyuria  Genitourinary: Negative for dysuria, flank pain, frequency, hematuria and urgency  Musculoskeletal: Negative for arthralgias, back pain, myalgias and neck pain  Skin: Negative for color change and pallor  Allergic/Immunologic: Negative for environmental allergies and food allergies  Neurological: Negative for dizziness, weakness, light-headedness, numbness and headaches  Hematological: Negative for adenopathy  Does not bruise/bleed easily  Psychiatric/Behavioral: Negative for behavioral problems, sleep disturbance and suicidal ideas  The patient is not nervous/anxious  Objective:  Physical Exam   Constitutional: He is oriented to person, place, and time  He appears well-developed and well-nourished  HENT:   Head: Normocephalic and atraumatic  Mouth/Throat: No oropharyngeal exudate  Eyes: Conjunctivae and EOM are normal  Right eye exhibits no discharge  Left eye exhibits no discharge  No scleral icterus  Neck: Normal range of motion  Neck supple  No tracheal deviation present  No thyromegaly present  Cardiovascular: Normal rate, regular rhythm and normal heart sounds  No murmur heard  Pulmonary/Chest: Effort normal and breath sounds normal  No respiratory distress  He has no wheezes  He has no rales  Abdominal: Soft  Bowel sounds are normal  He exhibits no distension and no mass  There is no tenderness  There is no rebound  Musculoskeletal: Normal range of motion  He exhibits no edema  Lymphadenopathy:     He has no cervical adenopathy  Neurological: He is alert and oriented to person, place, and time  He has normal reflexes  No cranial nerve deficit  Skin: Skin is warm  No rash noted  No erythema  No pallor  Psychiatric: He has a normal mood and affect  His behavior is normal  Judgment and thought content normal    Nursing note and vitals reviewed           Past Surgical History:   Procedure Laterality Date    COLONOSCOPY      last assessed 3/5/2013    OR COLONOSCOPY FLX DX W/COLLJ SPEC WHEN PFRMD N/A 5/2/2018    Procedure: COLONOSCOPY;  Surgeon: Chris Hackett MD;  Location: [facility];   Service: Gastroenterology       Family History   Problem Relation Age of Onset    Diabetes Mother         DM    Diabetes Paternal Grandmother         DM         Current Outpatient Medications:     lisinopril (ZESTRIL) 5 mg tablet, Take 1 tablet (5 mg total) by mouth daily, Disp: 90 tablet, Rfl: 1    metFORMIN (GLUCOPHAGE) 1000 MG tablet, Take 1 tablet (1,000 mg total) by mouth 2 (two) times a day with meals, Disp: 180 tablet, Rfl: 1    pravastatin (PRAVACHOL) 20 mg tablet, Take 1 tablet (20 mg total) by mouth daily, Disp: 90 tablet, Rfl: 1    glipiZIDE (GLUCOTROL) 5 mg tablet, Take 1 tablet (5 mg total) by mouth 2 (two) times a day before meals, Disp: 60 tablet, Rfl: 5    No Known Allergies    Vitals:    02/19/19 1521   BP: 130/70   Pulse: 104   Resp: 16   SpO2: 98%   Weight: 83 5 kg (184 lb)   Height: 5' 5" (1 651 m)

## 2019-02-21 LAB — SL AMB POCT GLUCOSE BLD: 323

## 2019-03-01 LAB
LEFT EYE DIABETIC RETINOPATHY: NORMAL
RIGHT EYE DIABETIC RETINOPATHY: NORMAL

## 2019-03-01 PROCEDURE — 2022F DILAT RTA XM EVC RTNOPTHY: CPT | Performed by: FAMILY MEDICINE

## 2020-01-03 DIAGNOSIS — I10 ESSENTIAL HYPERTENSION: ICD-10-CM

## 2020-01-05 PROCEDURE — 4010F ACE/ARB THERAPY RXD/TAKEN: CPT | Performed by: FAMILY MEDICINE

## 2020-01-05 RX ORDER — LISINOPRIL 5 MG/1
TABLET ORAL
Qty: 90 TABLET | Refills: 1 | Status: SHIPPED | OUTPATIENT
Start: 2020-01-05

## 2020-01-10 ENCOUNTER — TELEPHONE (OUTPATIENT)
Dept: FAMILY MEDICINE CLINIC | Facility: CLINIC | Age: 60
End: 2020-01-10

## 2020-01-10 NOTE — TELEPHONE ENCOUNTER
Lm for pt to cb to schedule yearly physical  He is also due for his regular dm follow up       Last ov: 02/19/2019  Last physical 03/01/2016

## 2020-03-02 ENCOUNTER — TELEPHONE (OUTPATIENT)
Dept: FAMILY MEDICINE CLINIC | Facility: CLINIC | Age: 60
End: 2020-03-02

## 2020-03-02 NOTE — TELEPHONE ENCOUNTER
I have tried speaking to the pharmacy 3 different times and phone just rings and sends me back to automated system  Will try again later

## 2020-03-02 NOTE — TELEPHONE ENCOUNTER
----- Message from [facility] sent at 3/2/2020 11:42 AM EST -----  Refill was sent to [facility] for this patient in January 2020  Please call pharmacy to cancel any refills that may be on this prescription  Patient has not been seen in over 1 year

## 2020-04-03 ENCOUNTER — TELEMEDICINE (OUTPATIENT)
Dept: FAMILY MEDICINE CLINIC | Facility: CLINIC | Age: 60
End: 2020-04-03
Payer: OTHER GOVERNMENT

## 2020-04-03 DIAGNOSIS — E78.2 MIXED HYPERLIPIDEMIA: ICD-10-CM

## 2020-04-03 DIAGNOSIS — E11.65 TYPE 2 DIABETES MELLITUS WITH HYPERGLYCEMIA, WITHOUT LONG-TERM CURRENT USE OF INSULIN (HCC): ICD-10-CM

## 2020-04-03 DIAGNOSIS — Z11.59 NEED FOR HEPATITIS C SCREENING TEST: ICD-10-CM

## 2020-04-03 DIAGNOSIS — Z20.828 EXPOSURE TO SARS-ASSOCIATED CORONAVIRUS: Primary | ICD-10-CM

## 2020-04-03 DIAGNOSIS — I10 ESSENTIAL HYPERTENSION: ICD-10-CM

## 2020-04-03 PROCEDURE — G2012 BRIEF CHECK IN BY MD/QHP: HCPCS | Performed by: FAMILY MEDICINE

## 2020-04-05 ENCOUNTER — TELEMEDICINE (OUTPATIENT)
Dept: FAMILY MEDICINE CLINIC | Facility: CLINIC | Age: 60
End: 2020-04-05

## 2020-04-05 VITALS — TEMPERATURE: 99.1 F

## 2020-04-05 DIAGNOSIS — R05.9 COUGH: ICD-10-CM

## 2020-04-05 DIAGNOSIS — E78.2 MIXED HYPERLIPIDEMIA: ICD-10-CM

## 2020-04-05 DIAGNOSIS — Z20.828 EXPOSURE TO SARS-ASSOCIATED CORONAVIRUS: Primary | ICD-10-CM

## 2020-04-05 DIAGNOSIS — E11.65 TYPE 2 DIABETES MELLITUS WITH HYPERGLYCEMIA, WITHOUT LONG-TERM CURRENT USE OF INSULIN (HCC): ICD-10-CM

## 2020-04-05 DIAGNOSIS — I10 ESSENTIAL HYPERTENSION: ICD-10-CM

## 2020-04-05 PROCEDURE — 99213 OFFICE O/P EST LOW 20 MIN: CPT | Performed by: FAMILY MEDICINE

## 2020-04-10 ENCOUNTER — TELEPHONE (OUTPATIENT)
Dept: FAMILY MEDICINE CLINIC | Facility: CLINIC | Age: 60
End: 2020-04-10

## 2020-04-13 ENCOUNTER — TELEMEDICINE (OUTPATIENT)
Dept: FAMILY MEDICINE CLINIC | Facility: CLINIC | Age: 60
End: 2020-04-13
Payer: OTHER GOVERNMENT

## 2020-04-13 ENCOUNTER — TELEPHONE (OUTPATIENT)
Dept: FAMILY MEDICINE CLINIC | Facility: CLINIC | Age: 60
End: 2020-04-13

## 2020-04-13 DIAGNOSIS — Z20.828 EXPOSURE TO SARS-ASSOCIATED CORONAVIRUS: Primary | ICD-10-CM

## 2020-04-13 PROCEDURE — 99211 OFF/OP EST MAY X REQ PHY/QHP: CPT | Performed by: FAMILY MEDICINE

## 2020-07-29 DIAGNOSIS — E11.9 TYPE 2 DIABETES MELLITUS WITHOUT COMPLICATION, WITHOUT LONG-TERM CURRENT USE OF INSULIN (HCC): ICD-10-CM

## 2020-08-03 DIAGNOSIS — E11.9 TYPE 2 DIABETES MELLITUS WITHOUT COMPLICATION, WITHOUT LONG-TERM CURRENT USE OF INSULIN (HCC): ICD-10-CM

## 2020-08-03 RX ORDER — GLIPIZIDE 5 MG/1
5 TABLET ORAL
Qty: 60 TABLET | Refills: 0 | Status: SHIPPED | OUTPATIENT
Start: 2020-08-03

## 2020-08-03 RX ORDER — GLIPIZIDE 5 MG/1
TABLET ORAL
Qty: 60 TABLET | Refills: 0 | Status: SHIPPED | OUTPATIENT
Start: 2020-08-03 | End: 2020-08-03 | Stop reason: SDUPTHER

## 2020-08-03 NOTE — TELEPHONE ENCOUNTER
Patient states he currently lost his job and has no insurance    do you mind sending?  Pt states he will call and make appointment once he is insured

## 2020-08-19 NOTE — ASSESSMENT & PLAN NOTE
Lab Results   Component Value Date    HGBA1C 8 1 (H) 01/24/2019     Fingerstick blood sugar in the office is 232, I will increase glipizide 5 mg twice a day and he will continue metformin his insurance does not cover Januvia and [facility], he will get his labs and follow-up in 2 months Phone number provided below is incorrect  I left a voice mail message for Mello Jacqueline at [phone] asking her to call back to schedule

## 2025-03-08 ENCOUNTER — HOSPITAL ENCOUNTER (EMERGENCY)
Facility: HOSPITAL | Age: 65
Discharge: HOME | End: 2025-03-08
Attending: EMERGENCY MEDICINE | Admitting: EMERGENCY MEDICINE

## 2025-03-08 ENCOUNTER — APPOINTMENT (EMERGENCY)
Dept: RADIOLOGY | Facility: HOSPITAL | Age: 65
End: 2025-03-08

## 2025-03-08 VITALS
OXYGEN SATURATION: 100 % | SYSTOLIC BLOOD PRESSURE: 176 MMHG | TEMPERATURE: 97.8 F | HEART RATE: 64 BPM | RESPIRATION RATE: 18 BRPM | DIASTOLIC BLOOD PRESSURE: 87 MMHG

## 2025-03-08 DIAGNOSIS — R17 SERUM TOTAL BILIRUBIN ELEVATED: ICD-10-CM

## 2025-03-08 DIAGNOSIS — M25.562 ACUTE PAIN OF LEFT KNEE: ICD-10-CM

## 2025-03-08 DIAGNOSIS — E11.42 DIABETIC POLYNEUROPATHY (HCC): ICD-10-CM

## 2025-03-08 DIAGNOSIS — Z75.8 DOES NOT HAVE PRIMARY CARE PROVIDER: ICD-10-CM

## 2025-03-08 DIAGNOSIS — E11.65 UNCONTROLLED DIABETES MELLITUS WITH HYPERGLYCEMIA (HCC): Primary | ICD-10-CM

## 2025-03-08 DIAGNOSIS — E11.9 TYPE 2 DIABETES MELLITUS WITHOUT COMPLICATION, WITHOUT LONG-TERM CURRENT USE OF INSULIN (HCC): ICD-10-CM

## 2025-03-08 LAB
ALBUMIN SERPL BCG-MCNC: 4.3 G/DL (ref 3.5–5)
ALP SERPL-CCNC: 76 U/L (ref 34–104)
ALT SERPL W P-5'-P-CCNC: 16 U/L (ref 7–52)
ANION GAP SERPL CALCULATED.3IONS-SCNC: 8 MMOL/L (ref 4–13)
AST SERPL W P-5'-P-CCNC: 11 U/L (ref 13–39)
BASOPHILS # BLD AUTO: 0.04 THOUSANDS/ÂΜL (ref 0–0.1)
BASOPHILS NFR BLD AUTO: 1 % (ref 0–1)
BILIRUB SERPL-MCNC: 1.67 MG/DL (ref 0.2–1)
BUN SERPL-MCNC: 10 MG/DL (ref 5–25)
CALCIUM SERPL-MCNC: 9.5 MG/DL (ref 8.4–10.2)
CHLORIDE SERPL-SCNC: 97 MMOL/L (ref 96–108)
CO2 SERPL-SCNC: 28 MMOL/L (ref 21–32)
CREAT SERPL-MCNC: 0.84 MG/DL (ref 0.6–1.3)
EOSINOPHIL # BLD AUTO: 0.07 THOUSAND/ÂΜL (ref 0–0.61)
EOSINOPHIL NFR BLD AUTO: 1 % (ref 0–6)
ERYTHROCYTE [DISTWIDTH] IN BLOOD BY AUTOMATED COUNT: 11.5 % (ref 11.6–15.1)
GFR SERPL CREATININE-BSD FRML MDRD: 93 ML/MIN/1.73SQ M
GLUCOSE SERPL-MCNC: 277 MG/DL (ref 65–140)
GLUCOSE SERPL-MCNC: 440 MG/DL (ref 65–140)
GLUCOSE SERPL-MCNC: 454 MG/DL (ref 65–140)
HCT VFR BLD AUTO: 41 % (ref 36.5–49.3)
HGB BLD-MCNC: 14.7 G/DL (ref 12–17)
IMM GRANULOCYTES # BLD AUTO: 0.02 THOUSAND/UL (ref 0–0.2)
IMM GRANULOCYTES NFR BLD AUTO: 0 % (ref 0–2)
LYMPHOCYTES # BLD AUTO: 2.92 THOUSANDS/ÂΜL (ref 0.6–4.47)
LYMPHOCYTES NFR BLD AUTO: 36 % (ref 14–44)
MCH RBC QN AUTO: 32.3 PG (ref 26.8–34.3)
MCHC RBC AUTO-ENTMCNC: 35.9 G/DL (ref 31.4–37.4)
MCV RBC AUTO: 90 FL (ref 82–98)
MONOCYTES # BLD AUTO: 0.58 THOUSAND/ÂΜL (ref 0.17–1.22)
MONOCYTES NFR BLD AUTO: 7 % (ref 4–12)
NEUTROPHILS # BLD AUTO: 4.53 THOUSANDS/ÂΜL (ref 1.85–7.62)
NEUTS SEG NFR BLD AUTO: 55 % (ref 43–75)
NRBC BLD AUTO-RTO: 0 /100 WBCS
PLATELET # BLD AUTO: 451 THOUSANDS/UL (ref 149–390)
PMV BLD AUTO: 9.7 FL (ref 8.9–12.7)
POTASSIUM SERPL-SCNC: 4.1 MMOL/L (ref 3.5–5.3)
PROT SERPL-MCNC: 7 G/DL (ref 6.4–8.4)
RBC # BLD AUTO: 4.55 MILLION/UL (ref 3.88–5.62)
SODIUM SERPL-SCNC: 133 MMOL/L (ref 135–147)
WBC # BLD AUTO: 8.16 THOUSAND/UL (ref 4.31–10.16)

## 2025-03-08 PROCEDURE — 82948 REAGENT STRIP/BLOOD GLUCOSE: CPT

## 2025-03-08 PROCEDURE — 96361 HYDRATE IV INFUSION ADD-ON: CPT

## 2025-03-08 PROCEDURE — 80053 COMPREHEN METABOLIC PANEL: CPT | Performed by: EMERGENCY MEDICINE

## 2025-03-08 PROCEDURE — 73564 X-RAY EXAM KNEE 4 OR MORE: CPT

## 2025-03-08 PROCEDURE — 96372 THER/PROPH/DIAG INJ SC/IM: CPT

## 2025-03-08 PROCEDURE — 96374 THER/PROPH/DIAG INJ IV PUSH: CPT

## 2025-03-08 PROCEDURE — 99285 EMERGENCY DEPT VISIT HI MDM: CPT | Performed by: EMERGENCY MEDICINE

## 2025-03-08 PROCEDURE — 85025 COMPLETE CBC W/AUTO DIFF WBC: CPT | Performed by: EMERGENCY MEDICINE

## 2025-03-08 PROCEDURE — 36415 COLL VENOUS BLD VENIPUNCTURE: CPT | Performed by: EMERGENCY MEDICINE

## 2025-03-08 PROCEDURE — 99284 EMERGENCY DEPT VISIT MOD MDM: CPT

## 2025-03-08 RX ORDER — KETOROLAC TROMETHAMINE 30 MG/ML
30 INJECTION, SOLUTION INTRAMUSCULAR; INTRAVENOUS ONCE
Status: DISCONTINUED | OUTPATIENT
Start: 2025-03-08 | End: 2025-03-08

## 2025-03-08 RX ORDER — KETOROLAC TROMETHAMINE 30 MG/ML
15 INJECTION, SOLUTION INTRAMUSCULAR; INTRAVENOUS ONCE
Status: COMPLETED | OUTPATIENT
Start: 2025-03-08 | End: 2025-03-08

## 2025-03-08 RX ADMIN — SODIUM CHLORIDE 1000 ML: 0.9 INJECTION, SOLUTION INTRAVENOUS at 13:34

## 2025-03-08 RX ADMIN — INSULIN HUMAN 10 UNITS: 100 INJECTION, SOLUTION PARENTERAL at 14:37

## 2025-03-08 RX ADMIN — KETOROLAC TROMETHAMINE 15 MG: 30 INJECTION, SOLUTION INTRAMUSCULAR at 13:36

## 2025-03-08 NOTE — ED PROVIDER NOTES
Time reflects when diagnosis was documented in both MDM as applicable and the Disposition within this note       Time User Action Codes Description Comment    3/8/2025  2:38 PM Nain Rousseau [E11.65] Uncontrolled diabetes mellitus with hyperglycemia (HCC)     3/8/2025  2:38 PM Nain Rousseau [M25.562] Acute pain of left knee     3/8/2025  2:39 PM Nain Rousseau [E11.42] Diabetic polyneuropathy (HCC)     3/8/2025  2:39 PM Nain Rousseau [Z75.8] Does not have primary care provider     3/8/2025  2:41 PM Nain Rousseau [E11.9] Type 2 diabetes mellitus without complication, without long-term current use of insulin (HCC)     3/8/2025  3:14 PM Nain Rousseau [R17] Serum total bilirubin elevated           ED Disposition       ED Disposition   Discharge    Condition   Stable    Date/Time   Sat Mar 8, 2025  3:50 PM    Comment   Mj Onofre discharge to home/self care.                   Assessment & Plan       Medical Decision Making  64-year-old male presented to the emergency department for evaluation of knee pain and bilateral feet numbness/tingling.  On arrival patient awake, alert and in no acute distress.  Initial vital signs show the patient was hypertensive but otherwise vital signs are within normal limits.  Point-of-care glucose elevated at 454.  On exam patient with tenderness to palpation to his left knee.  Left knee otherwise stable.  Distal lower extremity pulse, motor and sensation intact and symmetric bilaterally.  Differential includes but not limited to fracture, dislocation, DKA, electrolyte abnormality, SANDOVAL.  These and other diagnoses were considered.  Imaging of the left knee on my independent interpretation with no acute fracture or dislocation. Workup done in the emergency department showed the patient was hyperglycemic without evidence of DKA.  Patient was noted to have an elevated bilirubin of 1.67.  Patient treated symptomatically with Toradol with  improvement of symptoms.  Patient also received a fluid bolus and a dose of subcu insulin.  Repeat point-of-care glucose 277.  All diagnostic studies were discussed with the patient in detail.  The patient was provided with a prescription for metformin.  Recommendation was made for the patient to establish care with a PCP and to follow-up.  Follow-up information was provided.  Return precautions were discussed.    The patient (and/or family present) agrees with the plan for discharge and feels comfortable to go home with proper follow-up. Diagnostic tests were reviewed. Diagnosis, care plan and treatment options were discussed. All questions were answered prior to discharge. I considered the patient's other medical conditions as applicable/noted above in my medical decision making. Advised the patient to return for worsening or additional problems. The patient (and/or family present) verbalized understanding of the discharge instructions and warnings that would necessitate return to the Emergency Department.          Amount and/or Complexity of Data Reviewed  Labs: ordered. Decision-making details documented in ED Course.  Radiology: ordered and independent interpretation performed.    Risk  OTC drugs.  Prescription drug management.        ED Course as of 03/08/25 2349   Sat Mar 08, 2025   1406 GLUCOSE(!!): 440   1406 ANION GAP: 8   1406 Carbon Dioxide: 28   1406 Total Bilirubin(!): 1.67       Medications   sodium chloride 0.9 % bolus 1,000 mL (0 mL Intravenous Stopped 3/8/25 1515)   ketorolac (TORADOL) injection 15 mg (15 mg Intravenous Given 3/8/25 1336)   insulin regular (HumuLIN R,NovoLIN R) injection 10 Units (10 Units Subcutaneous Given 3/8/25 1437)       ED Risk Strat Scores                            SBIRT 20yo+      Flowsheet Row Most Recent Value   Initial Alcohol Screen: US AUDIT-C     1. How often do you have a drink containing alcohol? 0 Filed at: 03/08/2025 1307   2. How many drinks containing alcohol  "do you have on a typical day you are drinking?  0 Filed at: 03/08/2025 1307   3a. Male UNDER 65: How often do you have five or more drinks on one occasion? 0 Filed at: 03/08/2025 1307   Audit-C Score 0 Filed at: 03/08/2025 1307   MAN: How many times in the past year have you...    Used an illegal drug or used a prescription medication for non-medical reasons? Never Filed at: 03/08/2025 1307                            History of Present Illness       Chief Complaint   Patient presents with    Knee Pain     Patient c/o L knee pain with \"popping\" when flexing/extending knee that started approximately 2 weeks ago. 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 Additionally, pt has developed numbness/tingling and burning sensation to both balls of feet that only occurs at night. Pt has attempted warm compresses and otc meds w/o relief.        Past Medical History:   Diagnosis Date    Benign essential hypertension     last assessed 10/1/2013    Diabetes mellitus (HCC)     Hyperlipidemia     last assessed 10/1/2013    Polyp of sigmoid colon       Past Surgical History:   Procedure Laterality Date    COLONOSCOPY      last assessed 3/5/2013    MD COLONOSCOPY FLX DX W/COLLJ SPEC WHEN PFRMD N/A 5/2/2018    Procedure: COLONOSCOPY;  Surgeon: Arvind Reyez MD;  Location: [facility];  Service: Gastroenterology      Family History   Problem Relation Age of Onset    Diabetes Mother         DM    Diabetes Paternal Grandmother         DM      Social History     Tobacco Use    Smoking status: Former    Smokeless tobacco: Never    Tobacco comments:     per allscripts- 'former smoker'   Substance Use Topics    Alcohol use: Yes     Comment: weekend beer drinker    Drug use: No      E-Cigarette/Vaping      E-Cigarette/Vaping Substances      I have reviewed and agree with the history as documented.     64-year-old male with history of diabetes presents to the emergency department for evaluation of multiple " concerns.  Patient reports that over the past 2 weeks he has had left knee pain that he describes as an aching and popping sensation to his left knee when he is flexing and extending his knee.  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  He reports taking over-the-counter medications with only minimal relief.  The patient also reports that he has not been taking any prescribed medications for diabetes over the past 5 years.  He states that for the past 6 months he has had intermittent burning and tingling sensations to his bilateral feet.  States that the sensation is worse at night when he is trying to sleep.  Patient reports that he does not have health insurance so has not been following with a primary care provider to get refills of his medication.  No blurry vision.  No fevers, chills, nausea, vomiting or diarrhea.  No shortness of breath or chest pain.  No localized weakness.        Review of Systems   Constitutional:  Negative for chills and fever.   HENT:  Negative for ear pain and sore throat.    Eyes:  Negative for pain and visual disturbance.   Respiratory:  Negative for cough and shortness of breath.    Cardiovascular:  Negative for chest pain and palpitations.   Gastrointestinal:  Negative for abdominal pain and vomiting.   Genitourinary:  Negative for dysuria and hematuria.   Musculoskeletal:  Negative for arthralgias and back pain.   Skin:  Negative for color change and rash.   Neurological:  Positive for numbness. Negative for seizures and syncope.   All other systems reviewed and are negative.          Objective       ED Triage Vitals [03/08/25 1305]   Temperature Pulse Blood Pressure Respirations SpO2 Patient Position - Orthostatic VS   97.8 °F (36.6 °C) 96 160/78 18 100 % Sitting      Temp Source Heart Rate Source BP Location FiO2 (%) Pain Score    Oral Monitor Left arm -- 8      Vitals      Date and Time Temp Pulse  SpO2 Resp BP Pain Score FACES Pain Rating User   03/08/25 1530 -- 64 100 % 18 176/87 -- -- FN   03/08/25 1500 -- 67 100 % 18 178/89 -- -- FN   03/08/25 1336 -- -- -- -- -- 8 -- FN   03/08/25 1305 97.8 °F (36.6 °C) 96 100 % 18 160/78 8 -- DG            Physical Exam  Vitals and nursing note reviewed.   Constitutional:       General: He is not in acute distress.     Appearance: He is well-developed.   HENT:      Head: Normocephalic and atraumatic.   Eyes:      Conjunctiva/sclera: Conjunctivae normal.   Cardiovascular:      Rate and Rhythm: Normal rate and regular rhythm.      Heart sounds: No murmur heard.  Pulmonary:      Effort: Pulmonary effort is normal. No respiratory distress.      Breath sounds: Normal breath sounds.   Abdominal:      Palpations: Abdomen is soft.      Tenderness: There is no abdominal tenderness.   Musculoskeletal:         General: Tenderness present. No swelling.      Cervical back: Neck supple.      Right hip: Normal.      Left hip: Normal.      Right knee: Normal.      Left knee: Tenderness present. No LCL laxity, MCL laxity, ACL laxity or PCL laxity.Normal pulse.      Right ankle: Normal.      Left ankle: Normal.      Right foot: Normal.      Left foot: Normal.      Comments: Distal lower extremity pulse, motor and sensation intact and symmetric bilaterally   Feet:      Right foot:      Skin integrity: Skin integrity normal.      Left foot:      Skin integrity: Skin integrity normal.   Skin:     General: Skin is warm and dry.      Capillary Refill: Capillary refill takes less than 2 seconds.   Neurological:      Mental Status: He is alert.   Psychiatric:         Mood and Affect: Mood normal.         Results Reviewed       Procedure Component Value Units Date/Time    Fingerstick Glucose (POCT) [[id number]]  (Abnormal) Collected: 03/08/25 1533    Lab Status: Final result Specimen: Blood Updated: 03/08/25 1534     POC Glucose 277 mg/dl     Comprehensive metabolic panel [[id number]]  (Abnormal)  Collected: 03/08/25 1333    Lab Status: Final result Specimen: Blood from Arm, Left Updated: 03/08/25 1402     Sodium 133 mmol/L      Potassium 4.1 mmol/L      Chloride 97 mmol/L      CO2 28 mmol/L      ANION GAP 8 mmol/L      BUN 10 mg/dL      Creatinine 0.84 mg/dL      Glucose 440 mg/dL      Calcium 9.5 mg/dL      AST 11 U/L      ALT 16 U/L      Alkaline Phosphatase 76 U/L      Total Protein 7.0 g/dL      Albumin 4.3 g/dL      Total Bilirubin 1.67 mg/dL      eGFR 93 ml/min/1.73sq m     Narrative:      National Kid[employer] guidelines for Chronic Kidney Disease (CKD):     Stage 1 with normal or high GFR (GFR > 90 mL/min/1.73 square meters)    Stage 2 Mild CKD (GFR = 60-89 mL/min/1.73 square meters)    Stage 3A Moderate CKD (GFR = 45-59 mL/min/1.73 square meters)    Stage 3B Moderate CKD (GFR = 30-44 mL/min/1.73 square meters)    Stage 4 Severe CKD (GFR = 15-29 mL/min/1.73 square meters)    Stage 5 End Stage CKD (GFR <15 mL/min/1.73 square meters)  Note: GFR calculation is accurate only with a steady state creatinine    CBC and differential [[id number]]  (Abnormal) Collected: 03/08/25 1333    Lab Status: Final result Specimen: Blood from Arm, Left Updated: 03/08/25 1339     WBC 8.16 Thousand/uL      RBC 4.55 Million/uL      Hemoglobin 14.7 g/dL      Hematocrit 41.0 %      MCV 90 fL      MCH 32.3 pg      MCHC 35.9 g/dL      RDW 11.5 %      MPV 9.7 fL      Platelets 451 Thousands/uL      nRBC 0 /100 WBCs      Segmented % 55 %      Immature Grans % 0 %      Lymphocytes % 36 %      Monocytes % 7 %      Eosinophils Relative 1 %      Basophils Relative 1 %      Absolute Neutrophils 4.53 Thousands/µL      Absolute Immature Grans 0.02 Thousand/uL      Absolute Lymphocytes 2.92 Thousands/µL      Absolute Monocytes 0.58 Thousand/µL      Eosinophils Absolute 0.07 Thousand/µL      Basophils Absolute 0.04 Thousands/µL     Fingerstick Glucose (POCT) [[id number]]  (Abnormal) Collected: 03/08/25 1317    Lab Status: Final  result Specimen: Blood Updated: 03/08/25 1318     POC Glucose 454 mg/dl             XR knee 4+ views left injury   ED Interpretation by Nain Rousseau MD (03/08 1861)   No acute fracture or dislocation          Procedures    ED Medication and Procedure Management   Prior to Admission Medications   Prescriptions Last Dose Informant Patient Reported? Taking?   glipiZIDE (GLUCOTROL) 5 mg tablet Not Taking  No No   Sig: Take 1 tablet (5 mg total) by mouth 2 (two) times a day before meals   Patient not taking: Reported on 3/8/2025   lisinopril (ZESTRIL) 5 mg tablet Not Taking  No No   Sig: TAKE 1 TABLET BY MOUTH ONCE DAILY   Patient not taking: Reported on 3/8/2025   metFORMIN (GLUCOPHAGE) 1000 MG tablet Not Taking  No No   Sig: Take 1 tablet (1,000 mg total) by mouth 2 (two) times a day with meals   Patient not taking: Reported on 3/8/2025   metFORMIN (GLUCOPHAGE) 500 mg tablet   No Yes   Sig: Take 1 tablet (500 mg total) by mouth 2 (two) times a day with meals   pravastatin (PRAVACHOL) 20 mg tablet Not Taking  No No   Sig: Take 1 tablet (20 mg total) by mouth daily   Patient not taking: Reported on 3/8/2025      Facility-Administered Medications: None     Discharge Medication List as of 3/8/2025  3:51 PM        CONTINUE these medications which have CHANGED    Details   metFORMIN (GLUCOPHAGE) 500 mg tablet Take 1 tablet (500 mg total) by mouth 2 (two) times a day with meals, Starting Sat 3/8/2025, Until Mon 4/7/2025, Normal           CONTINUE these medications which have NOT CHANGED    Details   glipiZIDE (GLUCOTROL) 5 mg tablet Take 1 tablet (5 mg total) by mouth 2 (two) times a day before meals, Starting Mon 8/3/2020, Normal      lisinopril (ZESTRIL) 5 mg tablet TAKE 1 TABLET BY MOUTH ONCE DAILY, Normal      pravastatin (PRAVACHOL) 20 mg tablet Take 1 tablet (20 mg total) by mouth daily, Starting Tue 1/29/2019, Normal             ED SEPSIS DOCUMENTATION   Time reflects when diagnosis was documented in both MDM  as applicable and the Disposition within this note       Time User Action Codes Description Comment    3/8/2025  2:38 PM Nain Rousseau [E11.65] Uncontrolled diabetes mellitus with hyperglycemia (HCC)     3/8/2025  2:38 PM Nain Rousseau [M25.562] Acute pain of left knee     3/8/2025  2:39 PM Nain Rousseau [E11.42] Diabetic polyneuropathy (HCC)     3/8/2025  2:39 PM Nain Rousseau [Z75.8] Does not have primary care provider     3/8/2025  2:41 PM Nain Rousseau [E11.9] Type 2 diabetes mellitus without complication, without long-term current use of insulin (HCC)     3/8/2025  3:14 PM aNin Rousseau [R17] Serum total bilirubin elevated                  Nain Rousseau MD  03/08/25 0252

## 2025-03-11 ENCOUNTER — OFFICE VISIT (OUTPATIENT)
Dept: OBGYN CLINIC | Facility: CLINIC | Age: 65
End: 2025-03-11

## 2025-03-11 VITALS — BODY MASS INDEX: 30.62 KG/M2 | HEIGHT: 65 IN

## 2025-03-11 DIAGNOSIS — M25.562 ACUTE PAIN OF LEFT KNEE: ICD-10-CM

## 2025-03-11 DIAGNOSIS — M22.42 PATELLA, CHONDROMALACIA, LEFT: Primary | ICD-10-CM

## 2025-03-11 PROCEDURE — 20610 DRAIN/INJ JOINT/BURSA W/O US: CPT | Performed by: PHYSICAL MEDICINE & REHABILITATION

## 2025-03-11 PROCEDURE — 99204 OFFICE O/P NEW MOD 45 MIN: CPT | Performed by: PHYSICAL MEDICINE & REHABILITATION

## 2025-03-11 RX ORDER — MELOXICAM 15 MG/1
15 TABLET ORAL DAILY PRN
Qty: 60 TABLET | Refills: 0 | Status: SHIPPED | OUTPATIENT
Start: 2025-03-11

## 2025-03-11 RX ORDER — KETOROLAC TROMETHAMINE 30 MG/ML
30 INJECTION, SOLUTION INTRAMUSCULAR; INTRAVENOUS
Status: COMPLETED | OUTPATIENT
Start: 2025-03-11 | End: 2025-03-11

## 2025-03-11 RX ORDER — ROPIVACAINE HYDROCHLORIDE 5 MG/ML
10 INJECTION, SOLUTION EPIDURAL; INFILTRATION; PERINEURAL
Status: COMPLETED | OUTPATIENT
Start: 2025-03-11 | End: 2025-03-11

## 2025-03-11 RX ADMIN — KETOROLAC TROMETHAMINE 30 MG: 30 INJECTION, SOLUTION INTRAMUSCULAR; INTRAVENOUS at 13:30

## 2025-03-11 RX ADMIN — ROPIVACAINE HYDROCHLORIDE 10 ML: 5 INJECTION, SOLUTION EPIDURAL; INFILTRATION; PERINEURAL at 13:30

## 2025-03-11 NOTE — PROGRESS NOTES
1. Patella, chondromalacia, left    2. Acute pain of left knee      Orders Placed This Encounter   Procedures    Large joint arthrocentesis     New Medications Ordered This Visit   Medications    meloxicam (MOBIC) 15 mg tablet     Sig: Take 1 tablet (15 mg total) by mouth daily as needed for moderate pain     Dispense:  60 tablet     Refill:  0       Impression:  Left knee pain likely secondary to patellar chondromalacia/mild osteoarthritis.  Patient was recently evaluated in the emergency room and had a blood glucose of 454.  He has not had any care with a primary care physician in the past few years.  Today we proceeded with a ketorolac injection.  I have also prescribed him meloxicam.  I also provided him with a patellar J brace that he will wear for work.  I discussed the importance of following up with his primary care physician.  I will see him back in 3 weeks to reassess.    Imaging Studies (I personally reviewed images in PACS and report):  Left knee x-rays most recent to this encounter reviewed.  These images show medial joint space narrowing.  No acute osseous abnormalities.  This consistent with mild osteoarthritis.    No follow-ups on file.    Patient is in agreement with the above plan.    HPI:  Mj Onofre is a 64 y.o. 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  Location: In the knee.  Radiation: Denies.  Provocative: Kneeling.  Severity: Painful.  Associated Symptoms: Clicking.  Treatment so far: Ketorolac injection in the ED.    Following history reviewed and updated:  Past Medical History:   Diagnosis Date    Benign essential hypertension     last assessed 10/1/2013    Diabetes mellitus (HCC)     Hyperlipidemia     last assessed 10/1/2013    Polyp of sigmoid colon      Past Surgical History:   Procedure Laterality Date    COLONOSCOPY      last assessed 3/5/2013    [place]  "COLONOSCOPY FLX DX W/COLLJ SPEC WHEN PFRMD N/A 5/2/2018    Procedure: COLONOSCOPY;  Surgeon: Arvind Reyez MD;  Location: [facility];  Service: Gastroenterology     Social History   Social History     Substance and Sexual Activity   Alcohol Use Yes    Comment: weekend beer drinker     Social History     Substance and Sexual Activity   Drug Use No     Social History     Tobacco Use   Smoking Status Former   Smokeless Tobacco Never   Tobacco Comments    per allscripts- 'former smoker'     Family History   Problem Relation Age of Onset    Diabetes Mother         DM    Diabetes Paternal Grandmother         DM     Allergies   Allergen Reactions    Bee Venom Angioedema        Constitutional:  Ht 5' 5\" (1.651 m)   BMI 30.62 kg/m²    General: NAD.  Eyes: Anicteric sclerae.  Neck: Supple.  Lungs: Unlabored breathing.  Cardiovascular: No lower extremity edema.  Skin: Intact without erythema.  Neurologic: Sensation intact to light touch.  Psychiatric: Mood and affect are appropriate.    Left Knee Exam     Tenderness   The patient is experiencing tenderness in the medial retinaculum and medial joint line.    Range of Motion   Extension:  normal   Flexion:  abnormal     Tests   Varus: negative Valgus: negative    Other   Erythema: absent  Scars: absent  Sensation: normal  Pulse: present  Swelling: none  Effusion: no effusion present             Large joint arthrocentesis: L knee  Universal Protocol:  procedure performed by consultantConsent: Verbal consent obtained. Written consent not obtained.  Risks and benefits: risks, benefits and alternatives were discussed  Consent given by: patient  Timeout called at: 3/11/2025 1:53 PM.  Patient understanding: patient states understanding of the procedure being performed  Patient consent: the patient's understanding of the procedure matches consent given  Site marked: the operative site was marked  Radiology Images displayed and confirmed. If images not available, report reviewed: " imaging studies available  Patient identity confirmed: verbally with patient  Supporting Documentation  Indications: pain   Procedure Details  Location: knee - L knee  Needle size: 22 G  Ultrasound guidance: no  Approach: Inferolateral to patella.  Medications administered: 10 mL ropivacaine 0.5 %; 30 mg ketorolac 30 mg/mL    Patient tolerance: patient tolerated the procedure well with no immediate complications  Dressing:  Sterile dressing applied    There was little to no resistance encountered during the injection.    Risks of this procedure include:    - Risk of bleeding since a needle is involved.  - Risk of infection (1/10,000 chance as per recent studies).  Signs/symptoms were discussed and they would prompt an urgent evaluation at an emergency department.  - Risk of pigmentation or skin dimpling in the skin (2-3% chance as per recent studies) from the steroid.  - Risk of increased pain from steroid flare (1% chance as per recent studies) that typically lasts 24-48 hours.  - Risk of increased blood sugars from the steroid medication that can last for a few weeks.  If the patient is a diabetic or pre-diabetic, they were encouraged to closely monitor their blood sugars and discuss with PCP if elevated more than usual or if having symptoms.    The benefits outweigh the risks and so the procedure was completed.

## 2025-03-12 ENCOUNTER — OFFICE VISIT (OUTPATIENT)
Dept: INTERNAL MEDICINE CLINIC | Facility: CLINIC | Age: 65
End: 2025-03-12

## 2025-03-12 VITALS
HEART RATE: 101 BPM | WEIGHT: 160 LBS | HEIGHT: 68 IN | TEMPERATURE: 97.3 F | BODY MASS INDEX: 24.25 KG/M2 | SYSTOLIC BLOOD PRESSURE: 118 MMHG | DIASTOLIC BLOOD PRESSURE: 72 MMHG

## 2025-03-12 DIAGNOSIS — H35.09 ABNORMAL RETINAL VESSELS IN RIGHT EYE: ICD-10-CM

## 2025-03-12 DIAGNOSIS — M22.42 PATELLA, CHONDROMALACIA, LEFT: ICD-10-CM

## 2025-03-12 DIAGNOSIS — E11.65 TYPE 2 DIABETES MELLITUS WITH HYPERGLYCEMIA, WITHOUT LONG-TERM CURRENT USE OF INSULIN (HCC): ICD-10-CM

## 2025-03-12 DIAGNOSIS — I10 ESSENTIAL HYPERTENSION: ICD-10-CM

## 2025-03-12 DIAGNOSIS — E11.9 TYPE 2 DIABETES MELLITUS WITHOUT COMPLICATION, WITHOUT LONG-TERM CURRENT USE OF INSULIN (HCC): Primary | ICD-10-CM

## 2025-03-12 LAB
LEFT EYE DIABETIC RETINOPATHY: ABNORMAL
LEFT EYE IMAGE QUALITY: ABNORMAL
LEFT EYE MACULAR EDEMA: ABNORMAL
LEFT EYE OTHER RETINOPATHY: ABNORMAL
RIGHT EYE DIABETIC RETINOPATHY: ABNORMAL
RIGHT EYE IMAGE QUALITY: ABNORMAL
RIGHT EYE MACULAR EDEMA: ABNORMAL
RIGHT EYE OTHER RETINOPATHY: ABNORMAL
SEVERITY (EYE EXAM): ABNORMAL
SL AMB POCT HEMOGLOBIN AIC: 11.4 (ref ?–6.5)

## 2025-03-12 PROCEDURE — 99204 OFFICE O/P NEW MOD 45 MIN: CPT | Performed by: FAMILY MEDICINE

## 2025-03-12 PROCEDURE — 83036 HEMOGLOBIN GLYCOSYLATED A1C: CPT | Performed by: FAMILY MEDICINE

## 2025-03-12 RX ORDER — GLIPIZIDE 5 MG/1
5 TABLET ORAL
Qty: 60 TABLET | Refills: 3 | Status: SHIPPED | OUTPATIENT
Start: 2025-03-12

## 2025-03-12 RX ORDER — LISINOPRIL 5 MG/1
5 TABLET ORAL DAILY
Qty: 30 TABLET | Refills: 3 | Status: SHIPPED | OUTPATIENT
Start: 2025-03-12

## 2025-03-12 NOTE — ASSESSMENT & PLAN NOTE
Restart lisinopril 5 mg 1 po q day   Orders:    CBC and differential; Future    lisinopril (ZESTRIL) 5 mg tablet; Take 1 tablet (5 mg total) by mouth daily

## 2025-03-12 NOTE — ASSESSMENT & PLAN NOTE
Lab Results   Component Value Date    HGBA1C 11.4 (A) 03/12/2025       Orders:    Comprehensive metabolic panel; Future    Lipid panel; Future    Microalbumin,Urine; Future    IRIS Diabetic eye exam    Ambulatory Referral to Ophthalmology; Future

## 2025-03-12 NOTE — ASSESSMENT & PLAN NOTE
Continue to follow diet watchful of carbohydrates and control portions . Try to incorporate Exercise 30- 60 minutes 4 times per week  to start back on glucophage 1000 mg po bid and glucotrol 5 mg po bid , Iris exam and diabetic foot exams completed today , restart lisinopril 5 mg await lipid profile and then will start statin , follow up here 3 months sooner if needed     Lab Results   Component Value Date    HGBA1C 11.4 (A) 03/12/2025       Orders:    POCT hemoglobin A1c    glipiZIDE (GLUCOTROL) 5 mg tablet; Take 1 tablet (5 mg total) by mouth 2 (two) times a day before meals    metFORMIN (GLUCOPHAGE) 1000 MG tablet; Take 1 tablet (1,000 mg total) by mouth 2 (two) times a day with meals    IRIS Diabetic eye exam

## 2025-03-12 NOTE — PROGRESS NOTES
Name: Mj Onofre      : 1960      MRN: [id number]  Encounter Provider: Bessy Saez MD  Encounter Date: 3/12/2025   Encounter department: [facility]    Assessment & Plan  Type 2 diabetes mellitus without complication, without long-term current use of insulin (HCC)  Continue to follow diet watchful of carbohydrates and control portions . Try to incorporate Exercise 30- 60 minutes 4 times per week  to start back on glucophage 1000 mg po bid and glucotrol 5 mg po bid , Iris exam and diabetic foot exams completed today , restart lisinopril 5 mg await lipid profile and then will start statin , follow up here 3 months sooner if needed     Lab Results   Component Value Date    HGBA1C 11.4 (A) 2025       Orders:    POCT hemoglobin A1c    glipiZIDE (GLUCOTROL) 5 mg tablet; Take 1 tablet (5 mg total) by mouth 2 (two) times a day before meals    metFORMIN (GLUCOPHAGE) 1000 MG tablet; Take 1 tablet (1,000 mg total) by mouth 2 (two) times a day with meals    IRIS Diabetic eye exam    Type 2 diabetes mellitus with hyperglycemia, without long-term current use of insulin (HCC)    Lab Results   Component Value Date    HGBA1C 11.4 (A) 2025       Orders:    Comprehensive metabolic panel; Future    Lipid panel; Future    Microalbumin,Urine; Future    IRIS Diabetic eye exam    Ambulatory Referral to Ophthalmology; Future    Patella, chondromalacia, left  Patient had been haing pain x a few weeks 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 The BMI is above normal. Nutrition recommendations include decreasing portion sizes, encouraging healthy choices of fruits and vegetables, decreasing fast food intake, consuming healthier snacks, limiting drinks that contain sugar and reducing intake of saturated and trans fat. Exercise recommendations include exercising 3-5 times per week. Rationale for BMI follow-up plan is due to patient being overweight or obese.         History of Present Illness     HPINew patient here to establish care , he was seen in ED on 3/8/25 for knee pain , glucose 454 , has hx DM uncontrolled last OV fam med 2/2019   He has DM dating back to 2013 , last eye exam > 2 yrs wears reading glasses , no vision changes   L knee pain sx x 2 weeks construction was doing a lot of kneeling   Review of Systems   Constitutional:  Negative for chills and fever.   HENT:  Negative for ear pain and sore throat.    Eyes:  Negative for pain and visual disturbance.   Respiratory:  Negative for cough and shortness of breath.    Cardiovascular:  Negative for chest pain and palpitations.   Gastrointestinal:  Negative for abdominal pain and vomiting.   Genitourinary:  Negative for dysuria and hematuria.   Musculoskeletal:  Positive for arthralgias. Negative for back pain.        L knee    Skin:  Negative for color change and rash.   Neurological:  Negative for seizures and syncope.   All other systems reviewed and are negative.  Patient's shoes and socks removed.    Right Foot/Ankle   Right Foot Inspection  Skin Exam: skin intact and dry skin. No warmth, no callus, no erythema, no maceration, no abnormal color, no pre-ulcer, no ulcer and no callus.     Toe Exam: ROM and strength within normal limits.     Sensory   Monofilament testing: intact    Vascular  Capillary refills: < 3 seconds  The right DP pulse is 2+.     Left Foot/Ankle  Left Foot  Inspection  Skin Exam: skin intact and dry skin. No warmth, no erythema, no maceration, normal color, no pre-ulcer, no ulcer and no callus.     Toe Exam: ROM and strength within normal limits.     Sensory   Monofilament testing: intact    Vascular  Capillary refills: < 3 seconds  The left DP pulse is 2+. The left PT pulse is 1+.     Assign Risk Category  No deformity present  No loss of protective sensation  No weak pulses  Risk: 0      Past Medical History:   Diagnosis Date    Benign essential hypertension     last assessed 10/1/2013    Diabetes mellitus (HCC)     Hyperlipidemia     last assessed 10/1/2013    Polyp of sigmoid colon      Past Surgical History:   Procedure Laterality Date    COLONOSCOPY      last assessed 3/5/2013    [place] COLONOSCOPY FLX DX W/COLLJ SPEC WHEN PFRMD N/A 5/2/2018    Procedure: COLONOSCOPY;  Surgeon: Arvind Reyez MD;  Location: [facility];  Service: Gastroenterology     Family History   Problem Relation Age of Onset    Diabetes Mother         DM    Diabetes Paternal Grandmother         DM     Social History     Tobacco Use    Smoking status: Former    Smokeless tobacco: Never    Tobacco comments:     per allscripts- 'former smoker'   Vaping Use    Vaping status: Never Used   Substance and Sexual Activity    Alcohol use: Yes     Comment: weekend beer drinker    Drug use: No    Sexual activity: Not on file     Current Outpatient Medications on File Prior to Visit   Medication Sig    meloxicam (MOBIC) 15 mg tablet Take 1 tablet (15 mg total) by mouth daily as needed for moderate pain    pravastatin (PRAVACHOL) 20 mg tablet Take 1 tablet (20 mg total) by mouth daily (Patient not taking: Reported on 3/8/2025)    [DISCONTINUED] glipiZIDE (GLUCOTROL) 5 mg tablet Take 1 tablet (5 mg total) by mouth 2 (two) times a day before meals (Patient not taking: Reported on 3/8/2025)    [DISCONTINUED] lisinopril (ZESTRIL) 5 mg tablet TAKE 1 TABLET BY MOUTH ONCE DAILY (Patient not taking: Reported on  "3/8/2025)    [DISCONTINUED] metFORMIN (GLUCOPHAGE) 500 mg tablet Take 1 tablet (500 mg total) by mouth 2 (two) times a day with meals     Allergies   Allergen Reactions    Bee Venom Angioedema     Immunization History   Administered Date(s) Administered    Pneumococcal Conjugate PCV 7 05/06/2010    Tdap 03/05/2013     Objective   /72 (BP Location: Left arm, Patient Position: Sitting, Cuff Size: Large)   Pulse 101   Temp (!) 97.3 °F (36.3 °C) (Temporal)   Ht 5' 7.5\" (1.715 m)   Wt 72.6 kg (160 lb)   BMI 24.69 kg/m²     Physical Exam  Vitals and nursing note reviewed.   Constitutional:       General: He is not in acute distress.     Appearance: He is well-developed.      Comments: Skin with good color turgor , well hydrated ,no distress noted     HENT:      Head: Normocephalic and atraumatic.      Right Ear: No decreased hearing noted. No middle ear effusion. There is no impacted cerumen.      Left Ear: No decreased hearing noted.  No middle ear effusion. There is no impacted cerumen.      Mouth/Throat:      Pharynx: Oropharynx is clear.   Eyes:      Conjunctiva/sclera: Conjunctivae normal.   Neck:      Thyroid: No thyromegaly.   Cardiovascular:      Rate and Rhythm: Normal rate and regular rhythm.      Pulses: no weak pulses.           Dorsalis pedis pulses are 2+ on the right side and 2+ on the left side.        Posterior tibial pulses are 1+ on the left side.      Heart sounds: Normal heart sounds. No murmur heard.  Pulmonary:      Effort: Pulmonary effort is normal. No respiratory distress.      Breath sounds: Normal breath sounds.   Abdominal:      Palpations: Abdomen is soft.      Tenderness: There is no abdominal tenderness.   Musculoskeletal:         General: No swelling.      Cervical back: Neck supple.      Left knee: Decreased range of motion. Tenderness present over the patellar tendon.      Comments: L knee wrapped    Feet:      Right foot:      Skin integrity: Dry skin present. No ulcer, skin " breakdown, erythema, warmth or callus.      Left foot:      Skin integrity: Dry skin present. No ulcer, skin breakdown, erythema, warmth or callus.   Lymphadenopathy:      Cervical:      Right cervical: No superficial cervical adenopathy.     Left cervical: No superficial cervical adenopathy.   Skin:     General: Skin is warm and dry.      Capillary Refill: Capillary refill takes less than 2 seconds.      Comments: Dry skin heels only    Neurological:      Mental Status: He is alert.   Psychiatric:         Mood and Affect: Mood normal.

## 2025-03-12 NOTE — ASSESSMENT & PLAN NOTE
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

## 2025-03-18 ENCOUNTER — TELEPHONE (OUTPATIENT)
Dept: INTERNAL MEDICINE CLINIC | Facility: CLINIC | Age: 65
End: 2025-03-18

## 2025-03-18 NOTE — TELEPHONE ENCOUNTER
Patients wife Letty called because she said he was prescribed lisinopril during his appointment to establish care. She said that the lisinopril is causing an adverse reaction. Patient is experiencing  a burning sensation around the left leg chin area. Letty is unsure why bp meds were prescribed because pt bp typically isnt elevated. She said it was once in ED due to pain.

## 2025-03-24 NOTE — TELEPHONE ENCOUNTER
2nd attempt- Called patient, patient did not answer left a voicemail to call back.       Dosing Month 5 (Required For Cumulative Dosing): 40mg Daily

## 2025-03-25 NOTE — TELEPHONE ENCOUNTER
3rd attempt- Called patient, patient did not answer left a voicemail to call back.      Letter will be sent to patient's address on file

## 2025-03-31 ENCOUNTER — CONSULT (OUTPATIENT)
Dept: MULTI SPECIALTY CLINIC | Facility: CLINIC | Age: 65
End: 2025-03-31

## 2025-03-31 VITALS
WEIGHT: 160 LBS | TEMPERATURE: 98 F | DIASTOLIC BLOOD PRESSURE: 75 MMHG | BODY MASS INDEX: 24.69 KG/M2 | HEART RATE: 89 BPM | OXYGEN SATURATION: 98 % | SYSTOLIC BLOOD PRESSURE: 160 MMHG

## 2025-03-31 DIAGNOSIS — E11.42 DIABETIC POLYNEUROPATHY (HCC): ICD-10-CM

## 2025-03-31 DIAGNOSIS — E11.9 TYPE 2 DIABETES MELLITUS WITHOUT COMPLICATION, WITHOUT LONG-TERM CURRENT USE OF INSULIN (HCC): ICD-10-CM

## 2025-03-31 RX ORDER — GABAPENTIN 100 MG/1
100 CAPSULE ORAL 2 TIMES DAILY
Qty: 60 CAPSULE | Refills: 0 | Status: SHIPPED | OUTPATIENT
Start: 2025-03-31

## 2025-03-31 NOTE — PROGRESS NOTES
Podiatry Clinic Visit  Mj Onofre 64 y.o. male MRN: [id number]  Encounter: [id number]    Assessment & Plan        Diagnoses and all orders for this visit:    Diabetic polyneuropathy (HCC)  -     Ambulatory Referral to Podiatry  -     gabapentin (Neurontin) 100 mg capsule; Take 1 capsule (100 mg total) by mouth 2 (two) times a day    Type 2 diabetes mellitus without complication, without long-term current use of insulin (HCC)  -     Ambulatory Referral to Podiatry           Plan:  Patient was examined, evaluated, and treated with all questions and concerns addressed.  Diabetic foot exam performed. Patient is level 0 of diabetic foot risk.   Patient's last A1c is 11.4% collected on 3/12/25. Patient visited family medicine 2 weeks before and was not given any medication to bilateral diabetic polyneuropathy. As the symptoms are affecting his sleep so much, 100 mg gabapentin BID was prescribed. Adverse effects were discussed with the patient and he is amenable. 30 day dosage was prescribed and will evaluate in the next visit 4 weeks later for possible more prescription.  Stressed the importance of glycemic control, shoe gear, and proper diabetic foot care.  Diabetic foot care education performed. Addressed daily inspection and proper preventive foot care.  Recommended against any pedicure salon. Instead, patient should have nail care with podiatry office.  Patient was re-appointed for 4 weeks    - Dr. Amin  was available/present for entirety of patient encounter and present for all procedures.    History of Present Illness     HPI: Mj Onofre is a 64 y.o. male who presents with complaint of bilateral feet numbness, burning and tingling. Patient's most recent A1c is 11.4%. He visited family medicine 2 weeks before and was referred to podiatry office for diabetic foot care and polyneuropathy. Patient denies any recent injuries. The patient has no further podiatric complaints at this time.    Review of Systems    Constitutional: Negative.    HENT: Negative.    Eyes: Negative.    Respiratory: Negative.    Cardiovascular: Negative.    Gastrointestinal: Negative.    Musculoskeletal: left knee pain  Skin: negative  Neurological: diabetic polyneuropathy.        Historical Information   Past Medical History:   Diagnosis Date    Benign essential hypertension     last assessed 10/1/2013    Diabetes mellitus (HCC)     Hyperlipidemia     last assessed 10/1/2013    Polyp of sigmoid colon      Past Surgical History:   Procedure Laterality Date    COLONOSCOPY      last assessed 3/5/2013    [place] COLONOSCOPY FLX DX W/COLLJ SPEC WHEN PFRMD N/A 5/2/2018    Procedure: COLONOSCOPY;  Surgeon: Arvind Reyez MD;  Location: [facility];  Service: Gastroenterology     Social History   Social History     Substance and Sexual Activity   Alcohol Use Yes    Comment: weekend beer drinker     Social History     Substance and Sexual Activity   Drug Use No     Social History     Tobacco Use   Smoking Status Former   Smokeless Tobacco Never   Tobacco Comments    per allscripts- 'former smoker'     Family History:   Family History   Problem Relation Age of Onset    Diabetes Mother         DM    Diabetes Paternal Grandmother         DM       Meds/Allergies   Not in a hospital admission.  Allergies   Allergen Reactions    Bee Venom Angioedema       Objective     Current Vitals:   Blood Pressure: 160/75 (03/31/25 1320)  Pulse: 89 (03/31/25 1320)  Temperature: 98 °F (36.7 °C) (03/31/25 1320)  Temp Source: Temporal (03/31/25 1320)  Weight - Scale: 72.6 kg (160 lb) (03/31/25 1320)  SpO2: 98 % (03/31/25 1320)        /75 (BP Location: Left arm, Patient Position: Sitting, Cuff Size: Standard)   Pulse 89   Temp 98 °F (36.7 °C) (Temporal)   Wt 72.6 kg (160 lb)   SpO2 98%   BMI 24.69 kg/m²       Lower Extremity Exam:    Foot Exam    Right Foot/Ankle     Inspection and Palpation  Skin Exam: skin intact; no callus, no dry skin, no maceration, no abnormal  color, no ulcer and no erythema     Neurovascular  Dorsalis pedis: 2+  Posterior tibial: 2+      Left Foot/Ankle      Inspection and Palpation  Skin Exam: skin intact; no callus, no dry skin, no maceration, no abnormal color, no ulcer and no erythema     Neurovascular  Dorsalis pedis: 2+  Posterior tibial: 2+          Musculoskeletal:  MMT is 5/5 to all compartments of the LE B/L, +0/4 edema B/L, no Pain on  palpation of bilateral feet, Equinus is present. No pain with passive ROM of pedal joints.     Vascular:   DP pulses are present bilaterally and PT pulses are present bilaterally., CFT< 3sec to all digits. Pedal hair is Absent. Skin temperature warm to warm B/L.     Dermatological:  No open Lesions. Skin of the LE is normal texture, temperature, turgor B/L. Interdigital maceration is not present. All nails are normal. Xerotic skin is not noted of the dorsum and plantar surface of the feet bilaterally.        Neurologic:  Gross sensation is Diminished. Monofilament sensation is Intact. Sharp sensation is present. Patient reports numbness and tingling to bilateral forefoot dorsal and plantar with left foot tracking to the medial shin.                Patient's shoes and socks removed.    Right Foot/Ankle   Right Foot Inspection  Skin Exam: skin normal and skin intact. No dry skin, no warmth, no callus, no erythema, no maceration, no abnormal color, no pre-ulcer, no ulcer and no callus.     Toe Exam: ROM and strength within normal limits.     Sensory   Vibration: diminished  Proprioception: diminished  Monofilament testing: intact    Vascular  Capillary refills: < 3 seconds  The right DP pulse is 2+. The right PT pulse is 2+.     Left Foot/Ankle  Left Foot Inspection  Skin Exam: skin normal and skin intact. No dry skin, no warmth, no erythema, no maceration, normal color, no pre-ulcer, no ulcer and no callus.     Toe Exam: ROM and strength within normal limits.     Sensory   Vibration: diminished  Proprioception:  diminished  Monofilament testing: intact    Vascular  Capillary refills: < 3 seconds  The left DP pulse is 2+. The left PT pulse is 2+.     Assign Risk Category  Deformity present  No loss of protective sensation  No weak pulses  Risk: 0     Biomechanical Exam of LE:  Hip ROM WNL Bilateral, external and internal rotation about equal at 45° b/l  Knee ROM WNL Bilateral, no hyperextension noted b/l, no genu varum/valgum noted b/l  Ankle dorsiflexion with knee extended is limited and unable to get pass vertical on right and limited and unable to get pass vertical on left  Ankle dorsiflexion with knee flexed is limited and unable to get pass vertical on right and limited and unable to get pass vertical on left  Malleolar positioning is WNL b/l  STJ ROM WNL b/l, heel inversion is approximately 20° on right and 20° on left; heel eversion is approximately 10° on right and 10° on left; neutral calcaneal stance position is 0°   1st ray ROM WNL b/l, able to dorsiflex/plantarflex b/l  Tibial varum is 0° b/l, Resting calcaneal stance position is valgus and approximately 2° on right and valgus and approximately 2° on left  Gait analysis: apropulsive, mild pronated foot  Gross deformity noted:  HAV with flat foot

## 2025-04-01 ENCOUNTER — OFFICE VISIT (OUTPATIENT)
Dept: OBGYN CLINIC | Facility: CLINIC | Age: 65
End: 2025-04-01

## 2025-04-01 DIAGNOSIS — M22.42 PATELLA, CHONDROMALACIA, LEFT: Primary | ICD-10-CM

## 2025-04-01 PROCEDURE — 99214 OFFICE O/P EST MOD 30 MIN: CPT | Performed by: PHYSICAL MEDICINE & REHABILITATION

## 2025-04-01 RX ORDER — DICLOFENAC SODIUM 25 MG/1
25 TABLET, DELAYED RELEASE ORAL 2 TIMES DAILY PRN
Qty: 60 TABLET | Refills: 0 | Status: SHIPPED | OUTPATIENT
Start: 2025-04-01

## 2025-04-01 NOTE — PROGRESS NOTES
1. Patella, chondromalacia, left  Ambulatory referral to Physical Therapy    diclofenac sodium (VOLTAREN) 25 MG EC tablet        Orders Placed This Encounter   Procedures    Ambulatory referral to Physical Therapy        Impression:  Patient is here in follow up of left knee pain likely secondary to patellar chondromalacia/mild osteoarthritis.  Patient is now following with PCP for DMII management.  He recently had a ketorolac injection (no steroid due to HgA1c), meloxicam and patellar J brace.  He only had a few days of relief after the ketorolac.  We will have him start formal physical therapy.  He will also stop the meloxicam and we will try oral diclofenac.  He is currently self-pay and is self-employed.  Once he has insurance, can submit to get approval for viscosupplementation.  We will also have him try a reaction brace.  I will see him back in about 5 weeks to reassess. 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     Imaging Studies (I personally reviewed images in PACS and report):  Left knee x-rays most recent to this encounter reviewed.  These images show medial joint space narrowing.  No acute osseous abnormalities.  This consistent with mild osteoarthritis.    No follow-ups on file.    Patient is in agreement with the above plan.    HPI:  Mj Onofre is a 64 y.o. male  who presents in follow up.  Here for   Chief Complaint   Patient presents with    Follow-up     Patient had 2-3 days of relief from his injection on 3/11/25. He is still having a lot of pain in the left knee.       Since last visit: See above.    Following history reviewed and updated:  Past Medical History:   Diagnosis Date    Benign essential hypertension     last assessed 10/1/2013    Diabetes mellitus (HCC)     Hyperlipidemia     last assessed 10/1/2013    Polyp of sigmoid colon      Past Surgical History:   Procedure Laterality Date    COLONOSCOPY      last assessed 3/5/2013    [place] COLONOSCOPY FLX DX W/COLLJ  SPEC WHEN PFRMD N/A 5/2/2018    Procedure: COLONOSCOPY;  Surgeon: Arvind Reyez MD;  Location: [facility];  Service: Gastroenterology     Social History   Social History     Substance and Sexual Activity   Alcohol Use Yes    Comment: weekend beer drinker     Social History     Substance and Sexual Activity   Drug Use No     Social History     Tobacco Use   Smoking Status Former   Smokeless Tobacco Never   Tobacco Comments    per allscripts- 'former smoker'     Family History   Problem Relation Age of Onset    Diabetes Mother         DM    Diabetes Paternal Grandmother         DM     Allergies   Allergen Reactions    Bee Venom Angioedema        Constitutional:  There were no vitals taken for this visit.   General: NAD.  Eyes: Clear sclerae.  ENT: No inflammation, lesion, or mass of lips.  No tracheal deviation.  Musculoskeletal: As mentioned below.  Integumentary: No visible rashes or skin lesions.  Pulmonary/Chest: Effort normal. No respiratory distress.   Neuro: CN's grossly intact, LANDRUM.  Psych: Normal affect and judgement.  Vascular: WWP.    Left Knee Exam     Tenderness   The patient is experiencing tenderness in the medial retinaculum.    Range of Motion   Extension:  normal   Flexion:  abnormal     Other   Erythema: absent  Scars: absent  Sensation: normal  Pulse: present  Swelling: none  Effusion: no effusion present             Procedures

## 2025-04-11 DIAGNOSIS — E11.9 TYPE 2 DIABETES MELLITUS WITHOUT COMPLICATION, WITHOUT LONG-TERM CURRENT USE OF INSULIN (HCC): ICD-10-CM

## 2025-04-14 RX ORDER — GLIPIZIDE 5 MG/1
5 TABLET ORAL
Qty: 60 TABLET | Refills: 3 | Status: SHIPPED | OUTPATIENT
Start: 2025-04-14

## 2025-04-17 DIAGNOSIS — M22.42 PATELLA, CHONDROMALACIA, LEFT: ICD-10-CM

## 2025-04-18 RX ORDER — DICLOFENAC SODIUM 25 MG/1
25 TABLET, DELAYED RELEASE ORAL 2 TIMES DAILY PRN
Qty: 60 TABLET | Refills: 0 | Status: SHIPPED | OUTPATIENT
Start: 2025-04-18

## 2025-04-21 DIAGNOSIS — M22.42 PATELLA, CHONDROMALACIA, LEFT: Primary | ICD-10-CM

## 2025-04-23 ENCOUNTER — TELEPHONE (OUTPATIENT)
Dept: INTERNAL MEDICINE CLINIC | Facility: CLINIC | Age: 65
End: 2025-04-23

## 2025-04-28 NOTE — TELEPHONE ENCOUNTER
Patient sent [employer] message in regards to letter letting us know his phone # was incorrect on file.    Updated number and spoke with pts wife. He will call to schedule at a later date.

## 2025-05-01 ENCOUNTER — TELEPHONE (OUTPATIENT)
Age: 65
End: 2025-05-01

## 2025-05-01 NOTE — TELEPHONE ENCOUNTER
Caller: Patient's wife     Doctor: Dr. Juares    Reason for call: Asked about MRI and call was transferred to central scheduling

## 2025-05-02 ENCOUNTER — HOSPITAL ENCOUNTER (OUTPATIENT)
Dept: MRI IMAGING | Facility: HOSPITAL | Age: 65
Discharge: HOME | End: 2025-05-02
Attending: PHYSICAL MEDICINE & REHABILITATION

## 2025-05-02 DIAGNOSIS — M22.42 PATELLA, CHONDROMALACIA, LEFT: ICD-10-CM

## 2025-05-02 PROCEDURE — 73721 MRI JNT OF LWR EXTRE W/O DYE: CPT

## 2025-05-05 ENCOUNTER — OFFICE VISIT (OUTPATIENT)
Dept: MULTI SPECIALTY CLINIC | Facility: CLINIC | Age: 65
End: 2025-05-05

## 2025-05-05 VITALS — TEMPERATURE: 98 F | DIASTOLIC BLOOD PRESSURE: 88 MMHG | HEART RATE: 85 BPM | SYSTOLIC BLOOD PRESSURE: 154 MMHG

## 2025-05-05 DIAGNOSIS — E11.9 TYPE 2 DIABETES MELLITUS WITHOUT COMPLICATION, WITHOUT LONG-TERM CURRENT USE OF INSULIN (HCC): Primary | ICD-10-CM

## 2025-05-05 DIAGNOSIS — E11.42 DIABETIC POLYNEUROPATHY ASSOCIATED WITH TYPE 2 DIABETES MELLITUS (HCC): ICD-10-CM

## 2025-05-05 RX ORDER — GABAPENTIN 100 MG/1
200 CAPSULE ORAL 2 TIMES DAILY
Qty: 120 CAPSULE | Refills: 0 | Status: SHIPPED | OUTPATIENT
Start: 2025-05-05 | End: 2025-06-04

## 2025-05-05 NOTE — PROGRESS NOTES
Podiatry Clinic Visit  Mj Onofre 64 y.o. male MRN: [id number]  Encounter: [id number]    Assessment & Plan        Diagnoses and all orders for this visit:    Type 2 diabetes mellitus without complication, without long-term current use of insulin (HCC)    Diabetic polyneuropathy associated with type 2 diabetes mellitus (HCC)           Plan:  Patient was seen and examined with all their questions and concerns addressed.  Patient presents to for follow-up for diabetic peripheral neuropathy pain/numbness.  Patient has been on 100 mg of gabapentin twice a day for 1 month and reports mild improvement.  Will trial 200 mg of gabapentin twice a day for another month and see if we can get continued improvement. The old gabapentin prescription was discontinued. Told the patient to take two 100mg pills twice a day for 400mg total.  No wounds or soft tissue infection noted  Educated the patient on proper diabetic footcare, checking his feet every day, wearing well-fitting spaces shoes even while in the home, tight glycemic control and exercise.  Patient was advised to follow-up with his family doctor/endocrinologist for continued diabetic medical treatment.   Patient was re-appointed for 1 month    - Dr. Amin was available/present for entirety of patient encounter and present for all procedures.      History of Present Illness     HPI: Mj Onofre is a 64 y.o. male who presents for follow-up regarding bilateral diabetic polyneuropathy.  Patient has been taking his prescribed gabapentin and reports mild improvement.  Pain level of 8 out of 10 on the left foot  6 out of 10 on the right foot.  He reports no other podiatric complaints at this time.      Review of Systems   Constitutional: Negative.    HENT: Negative.    Eyes: Negative.    Respiratory: Negative.    Cardiovascular: Negative.    Gastrointestinal: Negative.    Musculoskeletal: negative.  Neurological: Negative.        Historical Information   Past Medical  History:   Diagnosis Date    Benign essential hypertension     last assessed 10/1/2013    Diabetes mellitus (HCC)     Hyperlipidemia     last assessed 10/1/2013    Polyp of sigmoid colon      Past Surgical History:   Procedure Laterality Date    COLONOSCOPY      last assessed 3/5/2013    [place] COLONOSCOPY FLX DX W/COLLJ SPEC WHEN PFRMD N/A 5/2/2018    Procedure: COLONOSCOPY;  Surgeon: Arvind Reyez MD;  Location: [facility];  Service: Gastroenterology     Social History   Social History     Substance and Sexual Activity   Alcohol Use Yes    Comment: weekend beer drinker     Social History     Substance and Sexual Activity   Drug Use No     Social History     Tobacco Use   Smoking Status Former   Smokeless Tobacco Never   Tobacco Comments    per allscripts- 'former smoker'     Family History:   Family History   Problem Relation Age of Onset    Diabetes Mother         DM    Diabetes Paternal Grandmother         DM       Meds/Allergies   Not in a hospital admission.  Allergies   Allergen Reactions    Bee Venom Angioedema       Objective     Current Vitals:   Blood Pressure: 154/88 (05/05/25 1323)  Pulse: 85 (05/05/25 1323)  Temperature: 98 °F (36.7 °C) (05/05/25 1323)  Temp Source: Temporal (05/05/25 1323)        /88 (BP Location: Right arm, Patient Position: Sitting, Cuff Size: Large)   Pulse 85   Temp 98 °F (36.7 °C) (Temporal)       Lower Extremity Exam:    Foot Exam    Musculoskeletal:  MMT is 5/5 to all compartments of the LE, +1/4 edema B/L, Hallux limitus is not present. Equinus is not present. No pain with passive ROM of pedal joints.     Vascular:   DP pulses and PT pulses are present bilaterally., CFT< 3sec to all digits. Pedal hair is Present. Pulse has regular rate and rhythm. Skin temperature warm to warm B/L.     Dermatological:  No open Lesions. Skin of the LE is normal texture, temperature, turgor. Interdigital maceration is not present.        Neurologic:  Gross sensation is intact. Protective  sensation is Diminished. Vibratory sensation is Diminished. Sharp sensation is absent.

## 2025-05-06 DIAGNOSIS — E11.65 TYPE 2 DIABETES MELLITUS WITH HYPERGLYCEMIA, WITHOUT LONG-TERM CURRENT USE OF INSULIN (HCC): ICD-10-CM

## 2025-05-06 DIAGNOSIS — M22.42 PATELLA, CHONDROMALACIA, LEFT: Primary | ICD-10-CM

## 2025-05-06 PROCEDURE — 20610 DRAIN/INJ JOINT/BURSA W/O US: CPT | Performed by: PHYSICAL MEDICINE & REHABILITATION

## 2025-05-06 PROCEDURE — 99214 OFFICE O/P EST MOD 30 MIN: CPT | Performed by: PHYSICAL MEDICINE & REHABILITATION

## 2025-05-06 RX ORDER — NABUMETONE 500 MG/1
500 TABLET, FILM COATED ORAL 2 TIMES DAILY PRN
Qty: 60 TABLET | Refills: 0 | Status: SHIPPED | OUTPATIENT
Start: 2025-05-06

## 2025-05-06 RX ORDER — ROPIVACAINE HYDROCHLORIDE 5 MG/ML
10 INJECTION, SOLUTION EPIDURAL; INFILTRATION; PERINEURAL
Status: COMPLETED | OUTPATIENT
Start: 2025-05-06 | End: 2025-05-06

## 2025-05-06 RX ORDER — TRIAMCINOLONE ACETONIDE 40 MG/ML
80 INJECTION, SUSPENSION INTRA-ARTICULAR; INTRAMUSCULAR
Status: COMPLETED | OUTPATIENT
Start: 2025-05-06 | End: 2025-05-06

## 2025-05-06 RX ADMIN — TRIAMCINOLONE ACETONIDE 80 MG: 40 INJECTION, SUSPENSION INTRA-ARTICULAR; INTRAMUSCULAR at 13:15

## 2025-05-06 RX ADMIN — ROPIVACAINE HYDROCHLORIDE 10 ML: 5 INJECTION, SOLUTION EPIDURAL; INFILTRATION; PERINEURAL at 13:15

## 2025-05-06 NOTE — ASSESSMENT & PLAN NOTE
Patient is here in follow up of left knee pain likely secondary to patellar chondromalacia/mild osteoarthritis and possibly medial meniscus tear.  Patient is now established with PCP for DMII management.  He recently had a ketorolac injection (no steroid due to HgA1c), meloxicam, reaction brace, diclofenac and patellar J brace.  He only had a few days of relief after the ketorolac.  He has yet to start formal physical therapy.  We had a lengthy discussion about trying an intra-articular steroid injection.  He reports that his recent blood sugars when checked with fingerstick have been under 200 with his most recent one being in the 80s.  He is not on insulin.  We decided to proceed with a knee injection steroid today due to the significant pain he is having.  He felt improved afterwards.  He will check his blood sugars twice daily for at least the next 2 weeks.  We discussed signs and symptoms of hyperglycemia and that they would warrant emergent intervention.  Patient is aware of the risks.  Can consider viscosupplementation in the future.  Orders:    Large joint arthrocentesis: L knee    nabumetone (RELAFEN) 500 mg tablet; Take 1 tablet (500 mg total) by mouth 2 (two) times a day as needed for moderate pain Take with food. Discontinue any other NSAIDs.

## 2025-05-06 NOTE — PROGRESS NOTES
Assessment & Plan  Patella, chondromalacia, left  Patient is here in follow up of left knee pain likely secondary to patellar chondromalacia/mild osteoarthritis and possibly medial meniscus tear.  Patient is now established with PCP for DMII management.  He recently had a ketorolac injection (no steroid due to HgA1c), meloxicam, reaction brace, diclofenac and patellar J brace.  He only had a few days of relief after the ketorolac.  He has yet to start formal physical therapy.  We had a lengthy discussion about trying an intra-articular steroid injection.  He reports that his recent blood sugars when checked with fingerstick have been under 200 with his most recent one being in the 80s.  He is not on insulin.  We decided to proceed with a knee injection steroid today due to the significant pain he is having.  He felt improved afterwards.  He will check his blood sugars twice daily for at least the next 2 weeks.  We discussed signs and symptoms of hyperglycemia and that they would warrant emergent intervention.  Patient is aware of the risks.  Can consider viscosupplementation in the future.  Orders:    Large joint arthrocentesis: L knee    nabumetone (RELAFEN) 500 mg tablet; Take 1 tablet (500 mg total) by mouth 2 (two) times a day as needed for moderate pain Take with food. Discontinue any other NSAIDs.    Type 2 diabetes mellitus with hyperglycemia, without long-term current use of insulin (HCC)    Lab Results   Component Value Date    HGBA1C 11.4 (A) 03/12/2025            No follow-ups on file.    Patient is in agreement with the above plan.    HPI:  Mj Onofre is a 64 y.o. male  who presents in follow up.  Here for   Chief Complaint   Patient presents with    Left Knee - Follow-up, Pain     MRI Review       Since last visit: See above.    Following history reviewed and updated:  Past Medical History:   Diagnosis Date    Benign essential hypertension     last assessed 10/1/2013    Diabetes mellitus (HCC)      Hyperlipidemia     last assessed 10/1/2013    Polyp of sigmoid colon      Past Surgical History:   Procedure Laterality Date    COLONOSCOPY      last assessed 3/5/2013    [place] COLONOSCOPY FLX DX W/COLLJ SPEC WHEN PFRMD N/A 5/2/2018    Procedure: COLONOSCOPY;  Surgeon: Arvind Reyez MD;  Location: [facility];  Service: Gastroenterology     Social History   Social History     Substance and Sexual Activity   Alcohol Use Yes    Comment: weekend beer drinker     Social History     Substance and Sexual Activity   Drug Use No     Social History     Tobacco Use   Smoking Status Former   Smokeless Tobacco Never   Tobacco Comments    per allscripts- 'former smoker'     Family History   Problem Relation Age of Onset    Diabetes Mother         DM    Diabetes Paternal Grandmother         DM     Allergies   Allergen Reactions    Bee Venom Angioedema        Constitutional:  There were no vitals taken for this visit.   General: NAD.  Eyes: Clear sclerae.  ENT: No inflammation, lesion, or mass of lips.  No tracheal deviation.  Musculoskeletal: As mentioned below.  Integumentary: No visible rashes or skin lesions.  Pulmonary/Chest: Effort normal. No respiratory distress.   Neuro: CN's grossly intact, LANDRUM.  Psych: Normal affect and judgement.  Vascular: WWP.    Left Knee Exam     Tenderness   The patient is experiencing tenderness in the medial joint line.    Range of Motion   Extension:  normal     Other   Erythema: absent  Scars: absent  Sensation: normal  Pulse: present  Swelling: none  Effusion: no effusion present             Large joint arthrocentesis: L knee  Universal Protocol:  procedure performed by consultantConsent: Verbal consent obtained. Written consent not obtained.  Risks and benefits: risks, benefits and alternatives were discussed  Consent given by: patient  Timeout called at: 5/6/2025 1:18 PM.  Patient understanding: patient states understanding of the procedure being performed  Patient consent: the patient's  understanding of the procedure matches consent given  Site marked: the operative site was marked  Radiology Images displayed and confirmed. If images not available, report reviewed: imaging studies available  Patient identity confirmed: verbally with patient  Supporting Documentation  Indications: pain     Is this a Visco injection? NoProcedure Details  Location: knee - L knee  Needle size: 22 G  Ultrasound guidance: no  Approach: Inferolateral to patella.  Medications administered: 80 mg triamcinolone acetonide 40 mg/mL; 10 mL ropivacaine 0.5 %    Patient tolerance: patient tolerated the procedure well with no immediate complications  Dressing:  Sterile dressing applied    There was little to no resistance encountered during the injection.    Risks of this procedure include:    - Risk of bleeding since a needle is involved.  - Risk of infection (1/10,000 chance as per recent studies).  Signs/symptoms were discussed and they would prompt an urgent evaluation at an emergency department.  - Risk of pigmentation or skin dimpling in the skin (2-3% chance as per recent studies) from the steroid.  - Risk of increased pain from steroid flare (1% chance as per recent studies) that typically lasts 24-48 hours.  - Risk of increased blood sugars from the steroid medication that can last for a few weeks.  If the patient is a diabetic or pre-diabetic, they were encouraged to closely monitor their blood sugars and discuss with PCP if elevated more than usual or if having symptoms.    The benefits outweigh the risks and so the procedure was completed.

## 2025-05-20 ENCOUNTER — TELEPHONE (OUTPATIENT)
Dept: INTERNAL MEDICINE CLINIC | Facility: CLINIC | Age: 65
End: 2025-05-20

## 2025-05-27 DIAGNOSIS — E11.9 TYPE 2 DIABETES MELLITUS WITHOUT COMPLICATION, WITHOUT LONG-TERM CURRENT USE OF INSULIN (HCC): ICD-10-CM

## 2025-05-28 RX ORDER — GLIPIZIDE 5 MG/1
5 TABLET ORAL
Qty: 60 TABLET | Refills: 0 | OUTPATIENT
Start: 2025-05-28

## 2025-05-29 DIAGNOSIS — E11.9 TYPE 2 DIABETES MELLITUS WITHOUT COMPLICATION, WITHOUT LONG-TERM CURRENT USE OF INSULIN (HCC): ICD-10-CM

## 2025-06-09 ENCOUNTER — OFFICE VISIT (OUTPATIENT)
Dept: MULTI SPECIALTY CLINIC | Facility: CLINIC | Age: 65
End: 2025-06-09

## 2025-06-09 VITALS
BODY MASS INDEX: 23.46 KG/M2 | DIASTOLIC BLOOD PRESSURE: 79 MMHG | TEMPERATURE: 98 F | OXYGEN SATURATION: 98 % | SYSTOLIC BLOOD PRESSURE: 135 MMHG | WEIGHT: 152 LBS | HEART RATE: 91 BPM

## 2025-06-09 DIAGNOSIS — E11.42 DIABETIC POLYNEUROPATHY ASSOCIATED WITH TYPE 2 DIABETES MELLITUS (HCC): Primary | ICD-10-CM

## 2025-06-09 DIAGNOSIS — E11.65 TYPE 2 DIABETES MELLITUS WITH HYPERGLYCEMIA, WITHOUT LONG-TERM CURRENT USE OF INSULIN (HCC): ICD-10-CM

## 2025-06-09 RX ORDER — GABAPENTIN 100 MG/1
200 CAPSULE ORAL 3 TIMES DAILY
Qty: 180 CAPSULE | Refills: 1 | Status: SHIPPED | OUTPATIENT
Start: 2025-06-09

## 2025-06-09 NOTE — PROGRESS NOTES
Podiatry Clinic Visit  Mj Onofre 64 y.o. male MRN: [id number]  Encounter: [id number]    Assessment & Plan        Diagnoses and all orders for this visit:    Diabetic polyneuropathy associated with type 2 diabetes mellitus (HCC)  -     gabapentin (Neurontin) 100 mg capsule; Take 2 capsules (200 mg total) by mouth 3 (three) times a day  -     Ambulatory Referral to Neurology; Future    Type 2 diabetes mellitus with hyperglycemia, without long-term current use of insulin (HCC)  -     gabapentin (Neurontin) 100 mg capsule; Take 2 capsules (200 mg total) by mouth 3 (three) times a day  -     Ambulatory Referral to Neurology; Future           Plan:  Patient was examined, evaluated, and treated with all questions and concerns addressed.  Reports little benefit from 400mg of gabapentin a day. Has not seen a neurologist before. Recommended taking 600mg a day and a referral to neurology was placed.  A new gabapentin prescription was placed for him to have enough to take until his neurology follow-up  Discussed that if he were to feel sleepy or lack of energy due to gabapentin he is to return to 400mg a day or less.  Stressed the importance of glycemic control, shoe gear, and proper diabetic foot care.  Diabetic foot care education performed. Addressed daily inspection and proper preventive foot care.  Follow-up as needed    - Dr. Carlos was available/present for entirety of patient encounter and present for all procedures.      History of Present Illness     HPI: Mj Onofre is a 64 y.o. male who presents complaining of continued peripheral neuropathy. He reports taking 400mg of gabapentin a day with little improvement. He has not seen a neurologist before. The patient has no further podiatric complaints at this time.      Review of Systems   Constitutional: Negative.    HENT: Negative.    Eyes: Negative.    Respiratory: Negative.    Cardiovascular: Negative.    Gastrointestinal: Negative.    Musculoskeletal:  negative  Skin: negative  Neurological: Negative.        Historical Information   Past Medical History[1]  Past Surgical History[2]  Social History   Social History     Substance and Sexual Activity   Alcohol Use Yes    Comment: weekend beer drinker     Social History     Substance and Sexual Activity   Drug Use No     Tobacco Use History[3]  Family History: Family History[4]    Meds/Allergies   Not in a hospital admission.  Allergies[5]    Objective     Current Vitals:   Blood Pressure: 135/79 (06/09/25 1625)  Pulse: 91 (06/09/25 1625)  Temperature: 98 °F (36.7 °C) (06/09/25 1625)  Temp Source: Temporal (06/09/25 1625)  Weight - Scale: 68.9 kg (152 lb) (06/09/25 1625)  SpO2: 98 % (06/09/25 1625)        /79 (BP Location: Left arm, Patient Position: Sitting, Cuff Size: Adult)   Pulse 91   Temp 98 °F (36.7 °C) (Temporal)   Wt 68.9 kg (152 lb)   SpO2 98%   BMI 23.46 kg/m²       Lower Extremity Exam:    Foot Exam    Musculoskeletal:  MMT is 5/5 to all compartments of the LE, +1/4 edema B/L, Hallux limitus is not present. Equinus is not present. No pain with passive ROM of pedal joints.     Vascular:   DP pulses and PT pulses are present bilaterally., CFT< 3sec to all digits. Pedal hair is Absent. Pulse has regular rate and rhythm. Skin temperature warm to warm B/L.     Dermatological:  No open Lesions. Skin of the LE is normal texture, temperature, turgor. Interdigital maceration is not present.        Neurologic:  Gross sensation is intact. Protective sensation is Absent. Vibratory sensation is Absent. Sharp sensation is absent.                      [1]   Past Medical History:  Diagnosis Date    Benign essential hypertension     last assessed 10/1/2013    Diabetes mellitus (HCC)     Hyperlipidemia     last assessed 10/1/2013    Polyp of sigmoid colon    [2]   Past Surgical History:  Procedure Laterality Date    COLONOSCOPY      last assessed 3/5/2013    [place] COLONOSCOPY FLX DX W/COLLJ SPEC WHEN PFRMD N/A  5/2/2018    Procedure: COLONOSCOPY;  Surgeon: Arvind Reyez MD;  Location: [facility];  Service: Gastroenterology   [3]   Social History  Tobacco Use   Smoking Status Former   Smokeless Tobacco Never   Tobacco Comments    per allscripts- 'former smoker'   [4]   Family History  Problem Relation Name Age of Onset    Diabetes Mother          DM    Diabetes Paternal Grandmother          DM   [5]   Allergies  Allergen Reactions    Bee Venom Angioedema

## 2025-07-25 DIAGNOSIS — E11.9 TYPE 2 DIABETES MELLITUS WITHOUT COMPLICATION, WITHOUT LONG-TERM CURRENT USE OF INSULIN (HCC): ICD-10-CM

## 2025-07-28 RX ORDER — GLIPIZIDE 5 MG/1
5 TABLET ORAL
Qty: 60 TABLET | Refills: 3 | Status: SHIPPED | OUTPATIENT
Start: 2025-07-28

## 2025-07-31 ENCOUNTER — APPOINTMENT (OUTPATIENT)
Dept: LAB | Facility: CLINIC | Age: 65
End: 2025-07-31

## 2025-07-31 ENCOUNTER — OFFICE VISIT (OUTPATIENT)
Dept: INTERNAL MEDICINE CLINIC | Facility: CLINIC | Age: 65
End: 2025-07-31

## 2025-07-31 VITALS
WEIGHT: 153 LBS | DIASTOLIC BLOOD PRESSURE: 78 MMHG | TEMPERATURE: 97.5 F | BODY MASS INDEX: 23.19 KG/M2 | HEIGHT: 68 IN | SYSTOLIC BLOOD PRESSURE: 115 MMHG | HEART RATE: 81 BPM

## 2025-07-31 DIAGNOSIS — E11.65 TYPE 2 DIABETES MELLITUS WITH HYPERGLYCEMIA, WITHOUT LONG-TERM CURRENT USE OF INSULIN (HCC): ICD-10-CM

## 2025-07-31 DIAGNOSIS — Z00.00 WELL ADULT EXAM: Primary | ICD-10-CM

## 2025-07-31 DIAGNOSIS — Z86.0100 HISTORY OF COLON POLYPS: ICD-10-CM

## 2025-07-31 DIAGNOSIS — Z12.11 SCREENING FOR COLORECTAL CANCER: ICD-10-CM

## 2025-07-31 DIAGNOSIS — Z91.89 NEED FOR DENTAL CARE: ICD-10-CM

## 2025-07-31 DIAGNOSIS — I10 ESSENTIAL HYPERTENSION: ICD-10-CM

## 2025-07-31 DIAGNOSIS — E78.2 MIXED HYPERLIPIDEMIA: ICD-10-CM

## 2025-07-31 DIAGNOSIS — H35.09 ABNORMAL RETINAL VESSELS IN RIGHT EYE: ICD-10-CM

## 2025-07-31 DIAGNOSIS — Z12.12 SCREENING FOR COLORECTAL CANCER: ICD-10-CM

## 2025-07-31 LAB
ALBUMIN SERPL BCG-MCNC: 4.2 G/DL (ref 3.5–5)
ALP SERPL-CCNC: 58 U/L (ref 34–104)
ALT SERPL W P-5'-P-CCNC: 18 U/L (ref 7–52)
ANION GAP SERPL CALCULATED.3IONS-SCNC: 10 MMOL/L (ref 4–13)
AST SERPL W P-5'-P-CCNC: 18 U/L (ref 13–39)
BASOPHILS # BLD AUTO: 0.07 THOUSANDS/ÂΜL (ref 0–0.1)
BASOPHILS NFR BLD AUTO: 1 % (ref 0–1)
BILIRUB SERPL-MCNC: 0.97 MG/DL (ref 0.2–1)
BUN SERPL-MCNC: 11 MG/DL (ref 5–25)
CALCIUM SERPL-MCNC: 9.7 MG/DL (ref 8.4–10.2)
CHLORIDE SERPL-SCNC: 102 MMOL/L (ref 96–108)
CHOLEST SERPL-MCNC: 190 MG/DL (ref ?–200)
CO2 SERPL-SCNC: 23 MMOL/L (ref 21–32)
CREAT SERPL-MCNC: 0.85 MG/DL (ref 0.6–1.3)
EOSINOPHIL # BLD AUTO: 0.13 THOUSAND/ÂΜL (ref 0–0.61)
EOSINOPHIL NFR BLD AUTO: 1 % (ref 0–6)
ERYTHROCYTE [DISTWIDTH] IN BLOOD BY AUTOMATED COUNT: 11.8 % (ref 11.6–15.1)
GFR SERPL CREATININE-BSD FRML MDRD: 93 ML/MIN/1.73SQ M
GLUCOSE P FAST SERPL-MCNC: 168 MG/DL (ref 65–99)
HCT VFR BLD AUTO: 41.4 % (ref 36.5–49.3)
HDLC SERPL-MCNC: 51 MG/DL
HGB BLD-MCNC: 14.8 G/DL (ref 12–17)
IMM GRANULOCYTES # BLD AUTO: 0.04 THOUSAND/UL (ref 0–0.2)
IMM GRANULOCYTES NFR BLD AUTO: 0 % (ref 0–2)
LDLC SERPL CALC-MCNC: 119 MG/DL (ref 0–100)
LYMPHOCYTES # BLD AUTO: 3.86 THOUSANDS/ÂΜL (ref 0.6–4.47)
LYMPHOCYTES NFR BLD AUTO: 37 % (ref 14–44)
MCH RBC QN AUTO: 34.2 PG (ref 26.8–34.3)
MCHC RBC AUTO-ENTMCNC: 35.7 G/DL (ref 31.4–37.4)
MCV RBC AUTO: 96 FL (ref 82–98)
MONOCYTES # BLD AUTO: 0.69 THOUSAND/ÂΜL (ref 0.17–1.22)
MONOCYTES NFR BLD AUTO: 7 % (ref 4–12)
NEUTROPHILS # BLD AUTO: 5.75 THOUSANDS/ÂΜL (ref 1.85–7.62)
NEUTS SEG NFR BLD AUTO: 54 % (ref 43–75)
NONHDLC SERPL-MCNC: 139 MG/DL
NRBC BLD AUTO-RTO: 0 /100 WBCS
PLATELET # BLD AUTO: 420 THOUSANDS/UL (ref 149–390)
PMV BLD AUTO: 9.8 FL (ref 8.9–12.7)
POTASSIUM SERPL-SCNC: 4.2 MMOL/L (ref 3.5–5.3)
PROT SERPL-MCNC: 7.1 G/DL (ref 6.4–8.4)
RBC # BLD AUTO: 4.33 MILLION/UL (ref 3.88–5.62)
SL AMB POCT HEMOGLOBIN AIC: 7.2 (ref ?–6.5)
SODIUM SERPL-SCNC: 135 MMOL/L (ref 135–147)
TRIGL SERPL-MCNC: 99 MG/DL (ref ?–150)
WBC # BLD AUTO: 10.54 THOUSAND/UL (ref 4.31–10.16)

## 2025-07-31 PROCEDURE — 99214 OFFICE O/P EST MOD 30 MIN: CPT | Performed by: FAMILY MEDICINE

## 2025-07-31 PROCEDURE — 36415 COLL VENOUS BLD VENIPUNCTURE: CPT

## 2025-07-31 PROCEDURE — 80061 LIPID PANEL: CPT

## 2025-07-31 PROCEDURE — 83036 HEMOGLOBIN GLYCOSYLATED A1C: CPT | Performed by: FAMILY MEDICINE

## 2025-07-31 PROCEDURE — 85025 COMPLETE CBC W/AUTO DIFF WBC: CPT

## 2025-07-31 PROCEDURE — 80053 COMPREHEN METABOLIC PANEL: CPT

## 2025-07-31 PROCEDURE — 99396 PREV VISIT EST AGE 40-64: CPT | Performed by: FAMILY MEDICINE

## 2025-08-06 DIAGNOSIS — E78.2 MIXED HYPERLIPIDEMIA: Primary | ICD-10-CM

## 2025-08-06 RX ORDER — ATORVASTATIN CALCIUM 10 MG/1
10 TABLET, FILM COATED ORAL DAILY
Qty: 30 TABLET | Refills: 3 | Status: SHIPPED | OUTPATIENT
Start: 2025-08-06

## 2025-08-20 ENCOUNTER — OFFICE VISIT (OUTPATIENT)
Dept: GASTROENTEROLOGY | Facility: AMBULARY SURGERY CENTER | Age: 65
End: 2025-08-20
Attending: FAMILY MEDICINE

## 2025-08-20 VITALS
WEIGHT: 150.8 LBS | OXYGEN SATURATION: 99 % | BODY MASS INDEX: 23.67 KG/M2 | SYSTOLIC BLOOD PRESSURE: 142 MMHG | DIASTOLIC BLOOD PRESSURE: 86 MMHG | HEIGHT: 67 IN | HEART RATE: 74 BPM

## 2025-08-20 DIAGNOSIS — Z00.00 WELL ADULT EXAM: ICD-10-CM

## 2025-08-20 DIAGNOSIS — Z12.11 SCREENING FOR COLORECTAL CANCER: ICD-10-CM

## 2025-08-20 DIAGNOSIS — Z12.12 SCREENING FOR COLORECTAL CANCER: ICD-10-CM

## 2025-08-20 DIAGNOSIS — Z86.0100 HISTORY OF COLON POLYPS: Primary | ICD-10-CM

## 2025-08-20 PROCEDURE — 99244 OFF/OP CNSLTJ NEW/EST MOD 40: CPT | Performed by: PHYSICIAN ASSISTANT

## 2025-08-21 LAB
LEFT EYE DIABETIC RETINOPATHY: POSITIVE
RIGHT EYE DIABETIC RETINOPATHY: POSITIVE